# Patient Record
Sex: MALE | Race: WHITE | NOT HISPANIC OR LATINO | Employment: FULL TIME | URBAN - METROPOLITAN AREA
[De-identification: names, ages, dates, MRNs, and addresses within clinical notes are randomized per-mention and may not be internally consistent; named-entity substitution may affect disease eponyms.]

---

## 2022-11-16 ENCOUNTER — OFFICE VISIT (OUTPATIENT)
Dept: FAMILY MEDICINE CLINIC | Facility: CLINIC | Age: 62
End: 2022-11-16

## 2022-11-16 VITALS
BODY MASS INDEX: 25.11 KG/M2 | WEIGHT: 175.4 LBS | SYSTOLIC BLOOD PRESSURE: 136 MMHG | HEART RATE: 72 BPM | OXYGEN SATURATION: 97 % | HEIGHT: 70 IN | DIASTOLIC BLOOD PRESSURE: 60 MMHG | TEMPERATURE: 96.1 F

## 2022-11-16 DIAGNOSIS — Z13.29 SCREENING FOR THYROID DISORDER: ICD-10-CM

## 2022-11-16 DIAGNOSIS — Z13.29 SCREENING FOR ENDOCRINE DISORDER: ICD-10-CM

## 2022-11-16 DIAGNOSIS — Z12.5 ENCOUNTER FOR PROSTATE CANCER SCREENING: ICD-10-CM

## 2022-11-16 DIAGNOSIS — Z13.0 SCREENING FOR DEFICIENCY ANEMIA: ICD-10-CM

## 2022-11-16 DIAGNOSIS — I10 PRIMARY HYPERTENSION: ICD-10-CM

## 2022-11-16 DIAGNOSIS — Z13.220 SCREENING FOR LIPID DISORDERS: ICD-10-CM

## 2022-11-16 DIAGNOSIS — Z12.11 ENCOUNTER FOR SCREENING COLONOSCOPY: ICD-10-CM

## 2022-11-16 DIAGNOSIS — Z00.00 WELL ADULT EXAM: Primary | ICD-10-CM

## 2022-11-16 DIAGNOSIS — F51.04 CHRONIC INSOMNIA: ICD-10-CM

## 2022-11-16 PROBLEM — L57.0 ACTINIC KERATOSIS: Status: ACTIVE | Noted: 2022-03-10

## 2022-11-16 PROBLEM — Z85.828 PERSONAL HISTORY OF SKIN CANCER: Status: ACTIVE | Noted: 2022-03-10

## 2022-11-16 PROBLEM — C44.91: Status: ACTIVE | Noted: 2021-12-15

## 2022-11-16 PROBLEM — C44.92: Status: ACTIVE | Noted: 2022-05-26

## 2022-11-16 PROBLEM — C44.329 SQUAMOUS CELL CARCINOMA OF CHEEK: Status: ACTIVE | Noted: 2021-12-15

## 2022-11-16 PROBLEM — R73.01 IFG (IMPAIRED FASTING GLUCOSE): Status: ACTIVE | Noted: 2021-11-12

## 2022-11-16 PROBLEM — L82.1 SEBORRHEIC KERATOSIS: Status: ACTIVE | Noted: 2022-03-10

## 2022-11-16 RX ORDER — HYDROCHLOROTHIAZIDE 25 MG/1
TABLET ORAL
COMMUNITY
End: 2022-11-16 | Stop reason: SDUPTHER

## 2022-11-16 RX ORDER — FLUOROURACIL 50 MG/G
CREAM TOPICAL DAILY
COMMUNITY
Start: 2022-03-10 | End: 2022-11-28 | Stop reason: ALTCHOICE

## 2022-11-16 RX ORDER — AMLODIPINE BESYLATE 10 MG/1
10 TABLET ORAL DAILY
COMMUNITY
Start: 2022-09-27

## 2022-11-16 RX ORDER — TRAZODONE HYDROCHLORIDE 100 MG/1
100 TABLET ORAL
Qty: 90 TABLET | Refills: 3
Start: 2022-11-16

## 2022-11-16 RX ORDER — HYDROCHLOROTHIAZIDE 25 MG/1
25 TABLET ORAL DAILY
Qty: 90 TABLET | Refills: 3 | Status: SHIPPED | OUTPATIENT
Start: 2022-11-16

## 2022-11-16 RX ORDER — AMLODIPINE BESYLATE 10 MG/1
10 TABLET ORAL DAILY
Qty: 90 TABLET | Refills: 3 | Status: CANCELLED
Start: 2022-11-16

## 2022-11-28 PROBLEM — Z00.00 WELL ADULT EXAM: Status: ACTIVE | Noted: 2022-11-28

## 2022-11-28 PROBLEM — F51.04 CHRONIC INSOMNIA: Status: ACTIVE | Noted: 2022-11-28

## 2022-11-28 NOTE — PROGRESS NOTES
Subjective     Ty Mayes is a 65 y o   male and is here for routine health maintenance  The patient reports no problems  History of Present Illness     Pt is here to establish care  Was seen in [address] [place] [address],[address] [facility]      Well Adult Physical   Patient here for a comprehensive physical exam       Diet and Physical Activity  Diet: well balanced diet  Weight concerns: Patient is overweight (BMI 25 0-29  9)  Exercise: infrequently      Depression Screen  PHQ-2/9 Depression Screening    Little interest or pleasure in doing things: 0 - not at all  Feeling down, depressed, or hopeless: 0 - not at all  PHQ-2 Score: 0  PHQ-2 Interpretation: Negative depression screen          General Health  Hearing: Normal:  bilateral  Vision: no vision problems  Dental: regular dental visits      Cancer Screening  Colononoscopy schedule  PSA blood work due    Smoker NO   Annual screening with low-dose helical computed tomography (CT) for patients age 54 to 76 years with history of smoking at least 30 pack-years and, if a former smoker, had quit within the previous 15 years      The following portions of the patient's history were reviewed and updated as appropriate: allergies, current medications, past family history, past medical history, past social history, past surgical history and problem list     Review of Systems     Review of Systems   Constitutional: Negative  Negative for fatigue and fever  HENT: Negative  Eyes: Negative  Respiratory: Negative  Negative for cough  Cardiovascular: Negative  Gastrointestinal: Negative  Endocrine: Negative  Genitourinary: Negative  Musculoskeletal: Negative  Skin: Negative  Allergic/Immunologic: Negative  Neurological: Negative  Psychiatric/Behavioral: Negative  Past Medical History     History reviewed  No pertinent past medical history  Past Surgical History     History reviewed  No pertinent surgical history      Social History     Social History     Socioeconomic History   • Marital status: Single     Spouse name: None   • Number of children: None   • Years of education: None   • Highest education level: None   Occupational History   • None   Tobacco Use   • Smoking status: Former     Types: Cigarettes     Quit date:      Years since quittin 9   • Smokeless tobacco: Never   Substance and Sexual Activity   • Alcohol use: Yes     Alcohol/week: 15 0 standard drinks     Types: 15 Cans of beer per week   • Drug use: Never   • Sexual activity: None   Other Topics Concern   • None   Social History Narrative   • None     Social Determinants of Health     Financial Resource Strain: Not on file   Food Insecurity: Not on file   Transportation Needs: Not on file   Physical Activity: Not on file   Stress: Not on file   Social Connections: Not on file   Intimate Partner Violence: Not on file   Housing Stability: Not on file       Family History     Family History   Problem Relation Age of Onset   • Colon cancer Mother    • Uterine cancer Mother    • Skin cancer Mother        Current Medications       Current Outpatient Medications:   •  amLODIPine (NORVASC) 10 mg tablet, Take 10 mg by mouth daily, Disp: , Rfl:   •  hydrochlorothiazide (HYDRODIURIL) 25 mg tablet, Take 1 tablet (25 mg total) by mouth daily, Disp: 90 tablet, Rfl: 3  •  traZODone (DESYREL) 100 mg tablet, Take 1 tablet (100 mg total) by mouth daily at bedtime, Disp: 90 tablet, Rfl: 3     Allergies     No Known Allergies    Objective     /60   Pulse 72   Temp (!) 96 1 °F (35 6 °C)   Ht 5' 9 5" (1 765 m)   Wt 79 6 kg (175 lb 6 4 oz)   SpO2 97%   BMI 25 53 kg/m²      Physical Exam  Vitals and nursing note reviewed  Constitutional:       Appearance: He is well-developed and well-nourished  HENT:      Head: Normocephalic        Right Ear: External ear normal       Left Ear: External ear normal       Nose: Nose normal       Mouth/Throat:      Mouth: Oropharynx is clear and moist    Eyes: Conjunctiva/sclera: Conjunctivae normal       Pupils: Pupils are equal, round, and reactive to light  Cardiovascular:      Rate and Rhythm: Normal rate and regular rhythm  Pulses: Intact distal pulses  Heart sounds: Normal heart sounds  Pulmonary:      Effort: Pulmonary effort is normal       Breath sounds: Normal breath sounds  Abdominal:      General: Bowel sounds are normal       Palpations: Abdomen is soft  Musculoskeletal:      Cervical back: Normal range of motion and neck supple  Skin:     General: Skin is warm and dry  Neurological:      Mental Status: He is alert and oriented to person, place, and time  Psychiatric:         Mood and Affect: Mood and affect normal          Behavior: Behavior normal          Thought Content: Thought content normal          Judgment: Judgment normal            No results found      Health Maintenance     Health Maintenance   Topic Date Due   • Hepatitis C Screening  Never done   • Hepatitis B Vaccine (1 of 3 - 3-dose series) Never done   • HIV Screening  Never done   • BMI: Followup Plan  Never done   • Annual Physical  Never done   • Colorectal Cancer Screening  Never done   • COVID-19 Vaccine (3 - Booster for Moderna series) 11/03/2021   • Depression Screening  11/16/2023   • BMI: Adult  11/16/2023   • DTaP,Tdap,and Td Vaccines (2 - Td or Tdap) 09/22/2027   • Influenza Vaccine  Completed   • Pneumococcal Vaccine: Pediatrics (0 to 5 Years) and At-Risk Patients (6 to 59 Years)  Aged Out   • HIB Vaccine  Aged Out   • IPV Vaccine  Aged Out   • Hepatitis A Vaccine  Aged Out   • Meningococcal ACWY Vaccine  Aged Out   • HPV Vaccine  Aged [employer] History   Administered Date(s) Administered   • COVID-19 MODERNA VACC 0 5 ML IM 05/06/2021, 06/03/2021   • Influenza Quadrivalent Preservative Free 3 years and older IM 10/12/2020, 10/18/2021, 10/25/2022   • Tdap 09/22/2017   • Zoster Vaccine Recombinant 10/18/2021, 10/25/2022       Assessment/Plan 1  Healthy male exam   2  Patient Counseling:   · Nutrition: Stressed importance of a well balanced diet, moderation of sodium/saturated fat, caloric balance and sufficient intake of fiber  · Exercise: Stressed the importance of regular exercise with a goal of 150 minutes per week  · Dental Health: Discussed daily flossing and brushing and regular dental visits     · Immunizations reviewed  · Discussed benefits of screening   · Discussed the patient's BMI with him  The BMI is above average; BMI management plan is completed  3  Cancer Screening   4  Labs   5  Trial trazodone, fasting blood work, schedule colonoscopy  6  Follow up in one year      Armen Rodriguez DO

## 2023-01-27 PROBLEM — Z00.00 WELL ADULT EXAM: Status: RESOLVED | Noted: 2022-11-28 | Resolved: 2023-01-27

## 2023-02-02 ENCOUNTER — TELEPHONE (OUTPATIENT)
Dept: OTHER | Facility: OTHER | Age: 63
End: 2023-02-02

## 2023-02-02 ENCOUNTER — TELEPHONE (OUTPATIENT)
Dept: GASTROENTEROLOGY | Facility: AMBULARY SURGERY CENTER | Age: 63
End: 2023-02-02

## 2023-02-02 NOTE — TELEPHONE ENCOUNTER
K[facility] R[facility] 28 Assessment    Name: Ebony Vega  YOB: 1960  Last Height: 5' 9 5" (1 765 m)  Last weight: 79 6 kg (175 lb 6 4 oz)  BMI: 25 53 kg/m²  Procedure: colonoscopy  Diagnosis: screening   Date of procedure: 3-8-2023  Prep:   Responsible : yes sister Jose Roberto Posada  Phone#: [phone]  Name completing form: Tanna Hendrickss  Date form completed: 02/02/23      If the patient answers yes to any of these questions, schedule in a hospital  Are you pregnant: No  Do you rely on a wheelchair for mobility: No  Have you been diagnosed with End Stage Renal Disease (ESRD): No  Do you need oxygen during the day: No  Have you had a heart attack or stroke within the past three months: No  Have you had a seizure within the past three months: No  Have you ever been informed by anesthesia that you have a difficult airway: No  Additional Questions  Have you had any cardiac testing or are under the care of a Cardiologist (see cardiac list): No  Cardiac list:   Do you have an implanted cardiac defibrillator: No (Comment:  This patient should be scheduled in the hospital)    Have any bleeding problems, such as anemia or hemophilia (If patient has H&H result below 8, schedule in hospital   H&H must be within 30 days of procedure): No    Had an organ transplant within the past 3 months: No    Do you have any present infections: No  Do you get short of breath when walking a few blocks: No  Have you been diagnosed with diabetes: No  Comments (provide cardiac provider information if applicable):

## 2023-02-02 NOTE — TELEPHONE ENCOUNTER
Pt needs a NPI # and a Diagnostic code sent to his gastro office in [place] to move forward with his surgery

## 2023-02-02 NOTE — TELEPHONE ENCOUNTER
Scheduled date of colonoscopy (as of today):03-   Physician performing colonoscopy:CARTER   Location of colonoscopy:[facility]  Bowel prep reviewed with patient:? Instructions reviewed with patient by:?   Clearances: N/A

## 2023-02-06 ENCOUNTER — TELEPHONE (OUTPATIENT)
Dept: FAMILY MEDICINE CLINIC | Facility: CLINIC | Age: 63
End: 2023-02-06

## 2023-02-06 NOTE — TELEPHONE ENCOUNTER
Confirmation Dejuan Dickson [id number]  Effective: 02/06/2023     Expires: 05/07/2023  Active  Referred From  [address]  Group KHJ: [id number]  Provider HZ: [id number]  Tax DM: [id number]  [address] [facility]  [place] [id number]  Referred To  [facility]  Specialty: Not Available  Tier 1  Group KYP: [id number]  Provider EH: [id number]  Tax NI: [id number]  Individual Provider OFK: [id number]  Provider Judd Guerra MD  This referral is valid at any location for the above group  Patient Pablo Brizuela  [id number]  Male  1960  [address] [place] [id number]  Clinical Information  Place of Service  Office  Service Type  Medical Care  Diagnoses  5 G78 31 - other general symptoms and signs  Procedures  2 43456 - unlisted evaluation and management service  Disclaimer  [facility] [employer] [facility] and [facility] Affiliates ([facility]) will pay for only those services covered under the [facility] Contract which are specifically noted and requested by the PCP or OBGYN on the referral  If any additional services, testing, or follow-up care are required, the PCP or OBGYN must be contacted prior to the delivery of such additional services for written approval on a separate referral  Non-referred services will not be covered by [facility]  Benefits are underwritten or administered by [employer] a subsidiary of [employer], which are independent licensees of the [employer] and [employer]

## 2023-02-14 ENCOUNTER — CONSULT (OUTPATIENT)
Dept: DERMATOLOGY | Facility: CLINIC | Age: 63
End: 2023-02-14

## 2023-02-14 VITALS — TEMPERATURE: 97.8 F | HEIGHT: 69 IN | BODY MASS INDEX: 27.55 KG/M2 | WEIGHT: 186 LBS

## 2023-02-14 DIAGNOSIS — D22.70 MULTIPLE BENIGN MELANOCYTIC NEVI OF UPPER AND LOWER EXTREMITIES AND TRUNK: ICD-10-CM

## 2023-02-14 DIAGNOSIS — L91.0 KELOID SCAR: ICD-10-CM

## 2023-02-14 DIAGNOSIS — Z85.828 HISTORY OF BASAL CELL CARCINOMA: ICD-10-CM

## 2023-02-14 DIAGNOSIS — D22.60 MULTIPLE BENIGN MELANOCYTIC NEVI OF UPPER AND LOWER EXTREMITIES AND TRUNK: ICD-10-CM

## 2023-02-14 DIAGNOSIS — D22.5 MULTIPLE BENIGN MELANOCYTIC NEVI OF UPPER AND LOWER EXTREMITIES AND TRUNK: ICD-10-CM

## 2023-02-14 DIAGNOSIS — L57.0 ACTINIC KERATOSIS: ICD-10-CM

## 2023-02-14 DIAGNOSIS — Z85.89 HISTORY OF SQUAMOUS CELL CARCINOMA: Primary | ICD-10-CM

## 2023-02-14 RX ORDER — FLUOROURACIL 50 MG/G
CREAM TOPICAL 2 TIMES DAILY
Qty: 40 G | Refills: 0 | Status: SHIPPED | OUTPATIENT
Start: 2023-02-14

## 2023-02-14 RX ORDER — CALCIPOTRIENE 50 UG/G
CREAM TOPICAL 2 TIMES DAILY
Qty: 60 G | Refills: 0 | Status: SHIPPED | OUTPATIENT
Start: 2023-02-14

## 2023-02-14 NOTE — PROGRESS NOTES
[facility] Note     Patient Name: Adalgisa Jackson  Encounter Date: 2/14/2023    Have you been cared for by a [facility]ologist in the last 3 years and, if so, which description applies to you? NO  I am considered a "new" patient and must complete all patient intake questions  I am MALE/not capable of bearing children  REVIEW OF SYSTEMS:  Have you recently had or currently have any of the following? · Recent fever or chills? No  · Any non-healing wound? No   PAST MEDICAL HISTORY:  Have you personally ever had or currently have any of the following? If "YES," then please provide more detail  · Skin cancer (such as Melanoma, Basal Cell Carcinoma, Squamous Cell Carcinoma? YES, BCC and SCC  · Tuberculosis, HIV/AIDS, Hepatitis B or C: No  · Systemic Immunosuppression such as Diabetes, Biologic or Immunotherapy, Chemotherapy, Organ Transplantation, Bone Marrow Transplantation No  · Radiation Treatment No   FAMILY HISTORY:  Any "first degree relatives" (parent, brother, sister, or child) with the following? • Skin Cancer, Pancreatic or Other Cancer? YES, Mother had hx of BCC and SCC   PATIENT EXPERIENCE:    • Do you want the Dermatologist to perform a COMPLETE skin exam today including a clinical examination under the "bra and underwear" areas? Yes  • If necessary, do we have your permission to call and leave a detailed message on your Preferred Phone number that includes your specific medical information?   NO      No Known Allergies   Current Outpatient Medications:   •  amLODIPine (NORVASC) 10 mg tablet, Take 10 mg by mouth daily, Disp: , Rfl:   •  hydrochlorothiazide (HYDRODIURIL) 25 mg tablet, Take 1 tablet (25 mg total) by mouth daily, Disp: 90 tablet, Rfl: 3  •  traZODone (DESYREL) 100 mg tablet, Take 1 tablet (100 mg total) by mouth daily at bedtime, Disp: 90 tablet, Rfl: 3          • Whom besides the patient is providing clinical information about today's encounter?   o NO ADDITIONAL HISTORIAN (patient alone provided history)    Physical Exam and Assessment/Plan by Diagnosis:      HISTORY OF BASAL CELL CARCINOMA    Physical Exam:  • Anatomic Location Affected:  Multiple sites: Left clavicular, Right Upper back and left upper back  • Morphological Description of scar:  Well healed surgical scar   • Suspected Recurrence: No  • Pertinent Positives:  • Pertinent Negatives: Additional History of Present Condition:  History of basal cell carcinoma with no sign of recurrence    Assessment and Plan:  Based on a thorough discussion of this condition and the management approach to it (including a comprehensive discussion of the known risks, side effects and potential benefits of treatment), the patient (family) agrees to implement the following specific plan:  • Monitor for recurrence or new lesions  • When outside we recommend using a wide brim hat, sunglasses, long sleeve and pants, sunscreen with SPF 52+ with reapplication every 2 hours, or SPF specific clothing   • Recommended yearly skin checks    How can basal cell carcinoma be prevented? The most important way to prevent BCC is to avoid sunburn  This is especially important in childhood and early life  Fair skinned individuals and those with a personal or family history of BCC should protect their skin from sun exposure daily, year-round and lifelong  • Stay indoors or under the shade in the middle of the day   • Wear covering clothing   • Apply high protection factor SPF50+ broad-spectrum sunscreens generously to exposed skin if outdoors   • Avoid indoor tanning (sun beds, solaria)  • Oral nicotinamide (vitamin B3) in a dose of 500 mg twice daily may reduce the number and severity of BCCs  What is the outlook for basal cell carcinoma? Most BCCs are cured by treatment  Cure is most likely if treatment is undertaken when the lesion is small    About 50% of people with BCC develop a second one within 3 years of the first  They are also at increased risk of other skin cancers, especially melanoma  Regular self-skin examinations and long-term annual skin checks by an experienced health professional are recommended  HISTORY OF SQUAMOUS CELL CARCINOMA     Physical Exam:  • Anatomic Location Affected:  Multiple sites ( Left [address] by plastics on 2/1/2021, Left cheek  • Morphological Description of Scar:  Well healed surgical scars  • Suspected Recurrence: no  • Regional adenopathy: no    Additional History of Present Condition:  History of SCC with no sign of recurrence  Assessment and Plan:  Based on a thorough discussion of this condition and the management approach to it (including a comprehensive discussion of the known risks, side effects and potential benefits of treatment), the patient (family) agrees to implement the following specific plan:  • When outside we recommend using a wide brim hat, sunglasses, long sleeve and pants, sunscreen with SPF 93+ with reapplication every 2 hours, or SPF specific clothing   • Monitor for recurrence or new lesions  • Recommended yearly skin checks  How can SCC be prevented? The most important way to prevent SCC is to avoid sunburn  This is especially important in childhood and early life  Fair skinned individuals and those with a personal or family history of BCC should protect their skin from sun exposure daily, year-round and lifelong  • Stay indoors or under the shade in the middle of the day   • Wear covering clothing   • Apply high protection factor SPF50+ broad-spectrum sunscreens generously to exposed skin if outdoors   • Avoid indoor tanning (sun beds, solaria)      What is the outlook for SCC? Most SCC are cured by treatment  Cure is most likely if treatment is undertaken when the lesion is small  A small percent of SCC's can spread to lymph  nodes and long term monitoring is indicated  They are also at increased risk of other skin cancers, especially melanoma  Regular self-skin examinations and long-term annual skin checks by an experienced health professional are recommended  ACTINIC KERATOSIS    Physical Exam:  • Anatomic Location Affected:  Scalp   • Morphological Description:   No induration     Additional History of Present Condition:  Patient states that he had scalp lesions treated previously with Fluorouracil 5% cream for couple days ( unsure for how  Long), but did not have any reaction to the treatment  He would like to try to treat lesions again  Assessment and Plan:  Based on a thorough discussion of this condition and the management approach to it (including a comprehensive discussion of the known risks, side effects and potential benefits of treatment), the patient (family) agrees to implement the following specific plan:    • Start Calcipotriene 0 05% cream and Fluorouracil 5% cream to scalp as explained in office  Use both topical creams together for 5 days  Discussed side effects like redness, irritation, burning sensation  Explained these symptoms as normal   May take a picture and message Dr Ivan Lujan through [email or web address] tanisha to see progress  • Calcipotriene cream and Fluorouracil cream ordered  Actinic keratoses are very common on sites repeatedly exposed to the sun, especially the backs of the hands and the face, most often affecting the ears, nose, cheeks, upper lip, vermilion of the lower lip, temples, forehead and balding scalp  In severely chronically sun-damaged individuals, they may also be found on the upper trunk, upper and lower limbs, and dorsum of feet  We discussed the theoretical premalignant (“pre-cancerous”) nature and etiology of these growths  We discussed the prevailing notion that actinic keratoses are a reflection of abnormal skin cell development due to DNA damage by short wavelength UVB    They are more likely to appear if the immune function is poor, due to aging, recent sun exposure, predisposing disease or certain drugs  We discussed that the main concern is that actinic keratoses may predispose to squamous cell carcinoma  It is rare for a solitary actinic keratosis to evolve to squamous cell carcinoma (SCC), but the risk of SCC occurring at some stage in a patient with more than 10 actinic keratoses is thought to be about 10 to 15%  A tender, thickened, ulcerated or enlarging actinic keratosis is suspicious of SCC  Actinic keratoses may be prevented by strict sun protection  If already present, keratoses may improve with a very high sun protection factor (50+) broad-spectrum sunscreen applied at least daily to affected areas, year-round  We recommend that UPF-rated clothing and hats and sunglasses be worn whenever possible and that a sunscreen-moisturizer combination product such as Neutrogena Daily Defense be applied at least three times a day  We performed a thorough discussion of treatment options and specific risk/benefits/alternatives including but not limited to medical “field” treatment with medications such as the following:    • Topical “field area” medications such as 5-fluorouracil or Aldara (specifically, the trouble with long-term compliance, blistering and local skin reaction versus the convenience of at-home therapy and that field therapy “gets what is not yet seen”)  • Cryotherapy (specifically, local pain, scarring, dyspigmentation, blistering, possible superinfection, and treats “only what we see” versus directed treatment today)  • Photodynamic therapy (specifically, local pain, scarring, dyspigmentation, blistering, possible superinfection, need to schedule for a later date, and time spent in the office versus field therapy that “gets what is not yet seen”)  KELOID SCAR    Physical Exam:  • Anatomic Location Affected:  Back  • Morphological Description:  hypertrophic  • Pertinent Positives:  • Pertinent Negatives:     Additional History of Present Condition:  Previous excisions scars  Denies pain, or issues  Assessment and Plan:  Based on a thorough discussion of this condition and the management approach to it (including a comprehensive discussion of the known risks, side effects and potential benefits of treatment), the patient (family) agrees to implement the following specific plan:  • Reassured benign  No treatment necessary     A keloid scar is a firm, smooth, hard growth due to spontaneous scar formation  It can arise soon after an injury, or develop months later  Keloids may be uncomfortable or itchy and extend well beyond the original wound  They may form on any part of the body, although the upper chest and shoulders are especially prone to them  The precise reason that wound healing sometimes leads to keloid formation is under investigation but is not yet clear  While most people never form keloids, others develop them after minor injuries, burns, insect bites and acne spots  Dark skinned people form keloids more easily than Caucasians  A keloid is harmless to general health and does not change into skin cancer  The following measures are helpful in at least some patients  • Emollients (creams and oils)  • Polyurethane or silicone scar reduction patches  • Silicone gel  • Oral or topical tranilast (an inhibitor of collagen synthesis)  • Pressure dressings  • Surgical excision (but in keloids, excision may result in a new keloid even larger than the original one)  • Intralesional corticosteroid injection, repeated every few weeks  • Intralesional 5-fluorouracil  • Cryotherapy  • Superficial X-ray treatment soon after surgery  • Pulsed dye laser   • Skin needling  • Subcision    Scar dressings should be worn for 12-24 hours per day, for at least 8 to 12 weeks, and perhaps for much longer            MELANOCYTIC NEVI ("Moles")    Physical Exam:  • Anatomic Location Affected:   Mostly on sun-exposed areas of the trunk and extremities  • Morphological Description: Scattered, 1-4mm round to ovoid, symmetric and evenly bordered, regularly pigmented macules/papules without outliers other than if noted elsewhere in today's note  • Pertinent Positives:  • Pertinent Negatives: Additional History of Present Condition:      Assessment and Plan:  Based on a thorough discussion of this condition and the management approach to it (including a comprehensive discussion of the known risks, side effects and potential benefits of treatment), the patient (family) agrees to implement the following specific plan:  • The patient was encouraged to use an SPF30+ broad spectrum sunscreen daily and re-apply every 2-3 outdoors while outside  The importance of sun protection, self-skin exams, and sun avoidance was emphasized  An annual full body skin exam is recommended, and the patient was encouraged to return to the office sooner for any new or changing lesions of concerns  • Benign, reassured  • Annual skin check     Melanocytic Nevi  Melanocytic nevi ("moles") are tan or brown, raised or flat areas of the skin which have an increased number of melanocytes  Melanocytes are the cells in our body which make pigment and account for skin color  Some moles are present at birth (I e , "congenital nevi"), while others come up later in life (i e , "acquired nevi")  The sun can stimulate the body to make more moles  Sunburns are not the only thing that triggers more moles  Chronic sun exposure can do it too  Clinically distinguishing a healthy mole from melanoma may be difficult, even for experienced dermatologists  The "ABCDE's" of moles have been suggested as a means of helping to alert a person to a suspicious mole and the possible increased risk of melanoma  The suggestions for raising alert are as follows:    Asymmetry: Healthy moles tend to be symmetric, while melanomas are often asymmetric    Asymmetry means if you draw a line through the mole, the two halves do not match in color, size, shape, or surface texture  Asymmetry can be a result of rapid enlargement of a mole, the development of a raised area on a previously flat lesion, scaling, ulceration, bleeding or scabbing within the mole  Any mole that starts to demonstrate "asymmetry" should be examined promptly by a board certified dermatologist      Border: Healthy moles tend to have discrete, even borders  The border of a melanoma often blends into the normal skin and does not sharply delineate the mole from normal skin  Any mole that starts to demonstrate "uneven borders" should be examined promptly by a board certified dermatologist      Color: Healthy moles tend to be one color throughout  Melanomas tend to be made up of different colors ranging from dark black, blue, white, or red  Any mole that demonstrates a color change should be examined promptly by a board certified dermatologist      Diameter: Healthy moles tend to be smaller than 0 6 cm in size; an exception are "congenital nevi" that can be larger  Melanomas tend to grow and can often be greater than 0 6 cm (1/4 of an inch, or the size of a pencil eraser)  This is only a guideline, and many normal moles may be larger than 0 6 cm without being unhealthy  Any mole that starts to change in size (small to bigger or bigger to smaller) should be examined promptly by a board certified dermatologist      Evolving: Healthy moles tend to "stay the same "  Melanomas may often show signs of change or evolution such as a change in size, shape, color, or elevation    Any mole that starts to itch, bleed, crust, burn, hurt, or ulcerate or demonstrate a change or evolution should be examined promptly by a board certified dermatologist             Jr Rojo,:  Isrrael Christian am acting as a scribe while in the presence of the attending physician :       I,:  Manasa Riojas MD personally performed the services described in this documentation    as scribed in my presence :

## 2023-02-20 ENCOUNTER — TELEPHONE (OUTPATIENT)
Dept: DERMATOLOGY | Facility: CLINIC | Age: 63
End: 2023-02-20

## 2023-02-20 NOTE — TELEPHONE ENCOUNTER
Patient left a message asking for a return call to discuss a procedure he is supposed to do  Patient was seen by Dr Brenda Tobar on 2/14/23 and in the note the only treatment I see if the use of calcipotriene and fluorouracil to scalp for 5 days  Attempt to reach patient  Left voicemail asking patient to return call

## 2023-02-22 ENCOUNTER — TELEPHONE (OUTPATIENT)
Dept: GASTROENTEROLOGY | Facility: CLINIC | Age: 63
End: 2023-02-22

## 2023-02-22 DIAGNOSIS — Z12.11 SCREENING FOR COLON CANCER: Primary | ICD-10-CM

## 2023-02-22 NOTE — TELEPHONE ENCOUNTER
Procedure confirmed  Colonoscopy     Via: Voice mail    Instructions given: Email     Prep Given: Golytely    Call the office if there are any questions

## 2023-03-06 ENCOUNTER — TELEPHONE (OUTPATIENT)
Dept: GASTROENTEROLOGY | Facility: CLINIC | Age: 63
End: 2023-03-06

## 2023-03-06 DIAGNOSIS — Z12.11 SCREENING FOR COLON CANCER: Primary | ICD-10-CM

## 2023-03-06 RX ORDER — SOD SULF/POT CHLORIDE/MAG SULF 1.479 G
TABLET ORAL
Qty: 24 TABLET | Refills: 0 | Status: SHIPPED | OUTPATIENT
Start: 2023-03-06

## 2023-03-06 NOTE — TELEPHONE ENCOUNTER
Patient called requesting Sutab, advised pt may not be covered by ins  He was ok with that   Will send instructions

## 2023-03-08 ENCOUNTER — HOSPITAL ENCOUNTER (OUTPATIENT)
Dept: GASTROENTEROLOGY | Facility: AMBULATORY SURGERY CENTER | Age: 63
Discharge: HOME | End: 2023-03-08

## 2023-03-08 ENCOUNTER — ANESTHESIA (OUTPATIENT)
Dept: GASTROENTEROLOGY | Facility: AMBULATORY SURGERY CENTER | Age: 63
End: 2023-03-08

## 2023-03-08 ENCOUNTER — ANESTHESIA EVENT (OUTPATIENT)
Dept: GASTROENTEROLOGY | Facility: AMBULATORY SURGERY CENTER | Age: 63
End: 2023-03-08

## 2023-03-08 VITALS
SYSTOLIC BLOOD PRESSURE: 169 MMHG | HEART RATE: 57 BPM | TEMPERATURE: 98.4 F | WEIGHT: 185 LBS | RESPIRATION RATE: 22 BRPM | OXYGEN SATURATION: 99 % | BODY MASS INDEX: 27.4 KG/M2 | DIASTOLIC BLOOD PRESSURE: 72 MMHG | HEIGHT: 69 IN

## 2023-03-08 DIAGNOSIS — Z12.11 SCREENING FOR COLON CANCER: ICD-10-CM

## 2023-03-08 RX ORDER — PROPOFOL 10 MG/ML
INJECTION, EMULSION INTRAVENOUS AS NEEDED
Status: DISCONTINUED | OUTPATIENT
Start: 2023-03-08 | End: 2023-03-08

## 2023-03-08 RX ORDER — SODIUM CHLORIDE, SODIUM LACTATE, POTASSIUM CHLORIDE, CALCIUM CHLORIDE 600; 310; 30; 20 MG/100ML; MG/100ML; MG/100ML; MG/100ML
50 INJECTION, SOLUTION INTRAVENOUS CONTINUOUS
Status: DISCONTINUED | OUTPATIENT
Start: 2023-03-08 | End: 2023-03-12 | Stop reason: HOSPADM

## 2023-03-08 RX ORDER — LIDOCAINE HYDROCHLORIDE 10 MG/ML
INJECTION, SOLUTION EPIDURAL; INFILTRATION; INTRACAUDAL; PERINEURAL AS NEEDED
Status: DISCONTINUED | OUTPATIENT
Start: 2023-03-08 | End: 2023-03-08

## 2023-03-08 RX ADMIN — PROPOFOL 20 MG: 10 INJECTION, EMULSION INTRAVENOUS at 15:03

## 2023-03-08 RX ADMIN — PROPOFOL 20 MG: 10 INJECTION, EMULSION INTRAVENOUS at 14:51

## 2023-03-08 RX ADMIN — SODIUM CHLORIDE, SODIUM LACTATE, POTASSIUM CHLORIDE, CALCIUM CHLORIDE 50 ML/HR: 600; 310; 30; 20 INJECTION, SOLUTION INTRAVENOUS at 14:28

## 2023-03-08 RX ADMIN — PROPOFOL 20 MG: 10 INJECTION, EMULSION INTRAVENOUS at 15:07

## 2023-03-08 RX ADMIN — LIDOCAINE HYDROCHLORIDE 50 MG: 10 INJECTION, SOLUTION EPIDURAL; INFILTRATION; INTRACAUDAL; PERINEURAL at 14:51

## 2023-03-08 RX ADMIN — PROPOFOL 20 MG: 10 INJECTION, EMULSION INTRAVENOUS at 15:11

## 2023-03-08 RX ADMIN — PROPOFOL 20 MG: 10 INJECTION, EMULSION INTRAVENOUS at 14:59

## 2023-03-08 RX ADMIN — PROPOFOL 150 MG: 10 INJECTION, EMULSION INTRAVENOUS at 14:47

## 2023-03-08 RX ADMIN — PROPOFOL 20 MG: 10 INJECTION, EMULSION INTRAVENOUS at 14:56

## 2023-03-08 NOTE — ANESTHESIA PREPROCEDURE EVALUATION
Procedure:  COLONOSCOPY    Relevant Problems   CARDIO   (+) Hypertension      NEURO/PSYCH   (+) Personal history of skin cancer        Physical Exam    Airway    Mallampati score: I  TM Distance: >3 FB  Neck ROM: full     Dental       Cardiovascular  Cardiovascular exam normal    Pulmonary  Pulmonary exam normal     Other Findings        Anesthesia Plan  ASA Score- 2     Anesthesia Type- IV sedation with anesthesia with ASA Monitors  Additional Monitors:   Airway Plan:           Plan Factors-Exercise tolerance (METS): >4 METS  Chart reviewed  EKG reviewed  Imaging results reviewed  Existing labs reviewed  Patient summary reviewed  Induction- intravenous  Postoperative Plan- Plan for postoperative opioid use  Planned trial extubation    Informed Consent- Anesthetic plan and risks discussed with patient  I personally reviewed this patient with the CRNA  Discussed and agreed on the Anesthesia Plan with the CRNA  Derek Lobo

## 2023-03-08 NOTE — ANESTHESIA POSTPROCEDURE EVALUATION
Post-Op Assessment Note    CV Status:  Stable  Pain Score: 0    Pain management: adequate     Mental Status:  Alert and awake   Hydration Status:  Euvolemic   PONV Controlled:  None   Airway Patency:  Patent      Post Op Vitals Reviewed: Yes      Staff: Anesthesiologist, CRNA   Comments: report given to RN; VSS; RA        No notable events documented      BP  157/77   Temp      Pulse  52   Resp   16   SpO2   98

## 2023-03-08 NOTE — H&P
History and Physical - [facility]  Lorena Matthews 65 y o  male MRN: [id number]    HPI: Lorena Matthews is a 65y o  year old male who presents for screening colonoscopy average risk    REVIEW OF SYSTEMS: Per the HPI, and otherwise unremarkable      Historical Information   Past Medical History:   Diagnosis Date   • Hypertension    • Kidney stone    • Skin cancer    • Gibran-Parkinson-White (WPW) syndrome      Past Surgical History:   Procedure Laterality Date   • BASAL CELL CARCINOMA EXCISION     • CARDIAC ELECTROPHYSIOLOGY MAPPING AND ABLATION     • CYSTOSCOPY W/ LASER LITHOTRIPSY     • HERNIA REPAIR     • SQUAMOUS CELL CARCINOMA EXCISION     • WISDOM TOOTH EXTRACTION       Social History   Social History     Substance and Sexual Activity   Alcohol Use Yes   • Alcohol/week: 15 0 standard drinks   • Types: 15 Cans of beer per week     Social History     Substance and Sexual Activity   Drug Use Never     Social History     Tobacco Use   Smoking Status Former   • Types: Cigarettes   • Quit date:    • Years since quittin 1   Smokeless Tobacco Never     Family History   Problem Relation Age of Onset   • Colon cancer Mother    • Uterine cancer Mother    • Skin cancer Mother        Meds/Allergies       Current Outpatient Medications:   •  amLODIPine (NORVASC) 10 mg tablet  •  hydrochlorothiazide (HYDRODIURIL) 25 mg tablet  •  calcipotriene (DOVONEX) 0 005 % cream  •  fluorouracil (EFUDEX) 5 % cream  •  polyethylene glycol (GOLYTELY) 4000 mL solution  •  Sodium Sulfate-Mag Sulfate-KCl (Sutab) 1828-146-155 MG TABS  •  traZODone (DESYREL) 100 mg tablet    Current Facility-Administered Medications:   •  lactated ringers infusion, 50 mL/hr, Intravenous, Continuous, 50 mL/hr at 23 1428    No Known Allergies    Objective     BP (!) 193/88 Comment: Dr Rachael Diamond at bedside is aware of elervated BP  Pulse 68   Temp 98 4 °F (36 9 °C) (Temporal)   Resp 19   Ht 5' 9" (1 753 m)   Wt 83 9 kg (185 lb)   SpO2 100% BMI 27 32 kg/m²     PHYSICAL EXAM    Gen: NAD AAOx3  Head: Normocephalic, Atraumatic  CV: S1S2 RRR no m/r/g  CHEST: Clear b/l no c/r/w  ABD: soft, +BS NT/ND  EXT: no edema    ASSESSMENT/PLAN:  This is a 65y o  year old male here for screening colonoscopy, and he is stable and optimized for his procedure

## 2023-03-16 NOTE — RESULT ENCOUNTER NOTE
I called the patient and reviewed pathology  He did have 3 adenomas  Accordingly he should have a 3-year recall as opposed to a 5-year recall we will make that change in the EMR    Should have a 2-day prep for next exam   He did take Cyr tabs which were not that effective on this last exam

## 2023-03-22 ENCOUNTER — TELEPHONE (OUTPATIENT)
Dept: FAMILY MEDICINE CLINIC | Facility: CLINIC | Age: 63
End: 2023-03-22

## 2023-03-22 NOTE — TELEPHONE ENCOUNTER
Patient called back requesting an answer as soon as possible because his job needs an answer today if he's coming in

## 2023-03-22 NOTE — TELEPHONE ENCOUNTER
Patient called in because he tested positive for Covid 3/21/2023  Sx began Saturday 3/18/2023 with a sore throat  Saturday-Tuesday he had symptoms and has been feeling better since yesterday and today  Patient would like to know if he's good to go back to work tomorrow since he feels a lot better? He doesn't need a note for work, nor a virtual he states

## 2023-05-09 DIAGNOSIS — I10 HYPERTENSION, UNSPECIFIED TYPE: ICD-10-CM

## 2023-05-09 RX ORDER — AMLODIPINE BESYLATE 10 MG/1
TABLET ORAL
Qty: 90 TABLET | Refills: 1 | Status: SHIPPED | OUTPATIENT
Start: 2023-05-09 | End: 2023-05-13 | Stop reason: SDUPTHER

## 2023-05-13 DIAGNOSIS — I10 PRIMARY HYPERTENSION: ICD-10-CM

## 2023-05-13 DIAGNOSIS — I10 HYPERTENSION, UNSPECIFIED TYPE: ICD-10-CM

## 2023-05-15 ENCOUNTER — TELEPHONE (OUTPATIENT)
Dept: OBGYN CLINIC | Facility: CLINIC | Age: 63
End: 2023-05-15

## 2023-05-15 ENCOUNTER — NURSE TRIAGE (OUTPATIENT)
Dept: OTHER | Facility: OTHER | Age: 63
End: 2023-05-15

## 2023-05-15 ENCOUNTER — OFFICE VISIT (OUTPATIENT)
Dept: URGENT CARE | Facility: CLINIC | Age: 63
End: 2023-05-15

## 2023-05-15 ENCOUNTER — APPOINTMENT (OUTPATIENT)
Dept: RADIOLOGY | Facility: CLINIC | Age: 63
End: 2023-05-15

## 2023-05-15 VITALS
RESPIRATION RATE: 18 BRPM | DIASTOLIC BLOOD PRESSURE: 82 MMHG | OXYGEN SATURATION: 98 % | BODY MASS INDEX: 26.81 KG/M2 | WEIGHT: 181 LBS | HEART RATE: 77 BPM | TEMPERATURE: 98.4 F | HEIGHT: 69 IN | SYSTOLIC BLOOD PRESSURE: 160 MMHG

## 2023-05-15 DIAGNOSIS — M25.512 ACUTE PAIN OF LEFT SHOULDER: ICD-10-CM

## 2023-05-15 DIAGNOSIS — S46.912A STRAIN OF LEFT SHOULDER, INITIAL ENCOUNTER: Primary | ICD-10-CM

## 2023-05-15 RX ORDER — AMLODIPINE BESYLATE 10 MG/1
10 TABLET ORAL DAILY
Qty: 90 TABLET | Refills: 0 | Status: SHIPPED | OUTPATIENT
Start: 2023-05-15

## 2023-05-15 RX ORDER — HYDROCHLOROTHIAZIDE 25 MG/1
25 TABLET ORAL DAILY
Qty: 90 TABLET | Refills: 0 | Status: SHIPPED | OUTPATIENT
Start: 2023-05-15

## 2023-05-15 NOTE — TELEPHONE ENCOUNTER
Caller: Sonido Melvin    Doctor: Marita Gregorio    Reason for call: NP 5/17/23 for shoulder strain ref by [facility]  Concerned they did not put him in a sling and wonders if he should put one on himself until he comes in      Call back#: [phone]    Thank you
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
## 2023-05-15 NOTE — TELEPHONE ENCOUNTER
"Regarding: fall, left shoulder pain  ----- Message from Vicky Avila sent at 5/15/2023  8:07 AM EDT -----  \"I fell yesterday and jammed up my left shoulder and am having pain  I plan on going to  after 10am but I want to make sure that is where I should be going for evaluation  \"    "

## 2023-05-15 NOTE — TELEPHONE ENCOUNTER
Patient advised to call [facility] or PCP for direction as we have not seen the patient in the office  Verbalized understanding

## 2023-05-15 NOTE — PATIENT INSTRUCTIONS
Patient was educated on left shoulder sprain  Patient was educated on heating left shoulder  Patient was educated on pendulums and wall climbs  Patient was educated on taking OTC Tylenol and anti-inflammatories for pain  Patient was given a referral to ortho  Shoulder Sprain   WHAT YOU NEED TO KNOW:   A shoulder sprain happens when a ligament in your shoulder is stretched or torn  Ligaments are the tough tissues that connect bones  Ligaments allow you to lift, lower, and rotate your arm  DISCHARGE INSTRUCTIONS:   Return to the emergency department if:   You feel severe pain in your shoulder when you move it, or it is touched  Your skin feels cold or clammy  You have numbness, tingling, or a feeling of pins and needles in your shoulder  The skin on your injured shoulder looks blue or pale  Call your doctor if:   You have new or increased swelling and pain in your shoulder  You have new or increased stiffness when you move your injured shoulder  Your symptoms do not improve within 5 to 7 days  You have questions or concerns about your condition or care  Medicines: You may need any of the following:  Acetaminophen  decreases pain and fever  It is available without a doctor's order  Ask how much to take and how often to take it  Follow directions  Read the labels of all other medicines you are using to see if they also contain acetaminophen, or ask your doctor or pharmacist  Acetaminophen can cause liver damage if not taken correctly  NSAIDs , such as ibuprofen, help decrease swelling, pain, and fever  This medicine is available with or without a doctor's order  NSAIDs can cause stomach bleeding or kidney problems in certain people  If you take blood thinner medicine, always ask your healthcare provider if NSAIDs are safe for you  Always read the medicine label and follow directions  Prescription pain medicine  may be given   Ask your healthcare provider how to take this medicine safely  Some prescription pain medicines contain acetaminophen  Do not take other medicines that contain acetaminophen without talking to your healthcare provider  Too much acetaminophen may cause liver damage  Prescription pain medicine may cause constipation  Ask your healthcare provider how to prevent or treat constipation  Take your medicine as directed  Contact your healthcare provider if you think your medicine is not helping or if you have side effects  Tell your provider if you are allergic to any medicine  Keep a list of the medicines, vitamins, and herbs you take  Include the amounts, and when and why you take them  Bring the list or the pill bottles to follow-up visits  Carry your medicine list with you in case of an emergency  Follow up with your doctor as directed:  Write down your questions so you remember to ask them during your visits  Self-care:   Rest  your shoulder so it can heal  Avoid moving your shoulder as your injury heals  This will help decrease the risk of more damage to your shoulder  Apply ice  on your shoulder for 20 to 30 minutes every 2 hours or as directed  Use an ice pack, or put crushed ice in a plastic bag  Cover it with a towel before you apply it to your shoulder  Ice helps prevent tissue damage and decreases swelling and pain  Compress your shoulder as directed  Compression provides support and helps decrease swelling and movement so your shoulder can heal  For mild sprains, you may be given a sling to support your arm  You may need a padded brace or a plaster cast to hold your shoulder in place if the sprain is more serious  How to wear a brace, sling, or splint:  A brace, sling, or splint may be needed to limit your movement and protect your injured shoulder  Wear your brace, sling, or splint as directed  You may need to wear it all the time and take it off only to bathe or do exercises   Ask your healthcare provider how long you should wear it     Keep your skin clean and dry  Padding under your armpit will help absorb sweat and prevent sores on your skin  Do not hunch your shoulders  This may cause pain  Keep your shoulders relaxed  Position the sling over your arm and hand so that it also covers your knuckles  This will help the sling support your wrist and hand  Position your wrist higher than your elbow  Your wrist may start to hurt or go numb if your sling is too short  Physical therapy:  A physical therapist teaches you exercises to help improve movement and strength, and to decrease pain  Prevent another injury:   Do not exercise when you are tired or in pain  Warm up and stretch before you exercise  Wear equipment to protect yourself when you play sports  Wear shoes that fit well and run on flat surfaces to prevent falls  © Copyright [employer] 2022 Information is for End User's use only and may not be sold, redistributed or otherwise used for commercial purposes  The above information is an  only  It is not intended as medical advice for individual conditions or treatments  Talk to your doctor, nurse or pharmacist before following any medical regimen to see if it is safe and effective for you

## 2023-05-15 NOTE — PROGRESS NOTES
[address]        NAME: Miranda Boyd is a 59 y o  male  : 1960    MRN: [id number]  DATE: May 15, 2023  TIME: 11:01 AM    Assessment and Plan   Strain of left shoulder, initial encounter [S41 [id number]]  1  Strain of left shoulder, initial encounter  XR shoulder 2+ vw left    Ambulatory Referral to Orthopedic Surgery          Xray of left shoulder- No acute fracture or dislocations pending radiology report    Patient was educated on high blood pressure  Patient Instructions   Patient was educated on left shoulder sprain  Patient was educated on heating left shoulder  Patient was educated on pendulums and wall climbs  Patient was educated on taking OTC Tylenol and anti-inflammatories for pain  Patient was given a referral to ortho  Chief Complaint     Chief Complaint   Patient presents with   • Shoulder Pain     PT presents with left shoulder pain that travels into the arm after falling on concrete yesterday late afternoon  Pt denies any other injuries and remember the fall  PT took Aleve this morning but states minimal pain relief  History of Present Illness       Patient is here today complaining of left shoulder pain  Patient reports on 23 he was washing his canoe and slipped hitting his left shoulder  Patient reports left shoulder pain increases with movement  Patient is currently taking Naprosyn for pain  Patient reports no recent prior left shoulder injuries  Review of Systems   Review of Systems   Constitutional: Negative  Respiratory: Negative  Cardiovascular: Negative  Musculoskeletal:        Left shoulder pain   Psychiatric/Behavioral: Negative            Current Medications       Current Outpatient Medications:   •  amLODIPine (NORVASC) 10 mg tablet, Take 1 tablet (10 mg total) by mouth daily, Disp: 90 tablet, Rfl: 0  •  hydrochlorothiazide (HYDRODIURIL) 25 mg tablet, Take 1 tablet (25 mg total) by mouth daily, Disp: 90 tablet, Rfl: 1  •  traZODone "(DESYREL) 100 mg tablet, Take 1 tablet (100 mg total) by mouth daily at bedtime, Disp: 90 tablet, Rfl: 3  •  calcipotriene (DOVONEX) 0 005 % cream, Apply topically 2 (two) times a day For 5 days  , Disp: 60 g, Rfl: 0  •  fluorouracil (EFUDEX) 5 % cream, Apply topically 2 (two) times a day, Disp: 40 g, Rfl: 0  •  Sodium Sulfate-Mag Sulfate-KCl (Sutab) 6063-162-857 MG TABS, As directed, Disp: 24 tablet, Rfl: 0    Current Allergies     Allergies as of 05/15/2023   • (No Known Allergies)            The following portions of the patient's history were reviewed and updated as appropriate: allergies, current medications, past family history, past medical history, past social history, past surgical history and problem list      Past Medical History:   Diagnosis Date   • Hypertension    • Kidney stone    • Skin cancer    • Gibran-Parkinson-White (WPW) syndrome        Past Surgical History:   Procedure Laterality Date   • BASAL CELL CARCINOMA EXCISION     • CARDIAC ELECTROPHYSIOLOGY MAPPING AND ABLATION     • CYSTOSCOPY W/ LASER LITHOTRIPSY     • HERNIA REPAIR     • SQUAMOUS CELL CARCINOMA EXCISION     • WISDOM TOOTH EXTRACTION         Family History   Problem Relation Age of Onset   • Colon cancer Mother    • Uterine cancer Mother    • Skin cancer Mother          Medications have been verified  Objective   /82   Pulse 77   Temp 98 4 °F (36 9 °C)   Resp 18   Ht 5' 9\" (1 753 m)   Wt 82 1 kg (181 lb)   SpO2 98%   BMI 26 73 kg/m²   No LMP for male patient  Physical Exam     Physical Exam  Vitals and nursing note reviewed  Constitutional:       Appearance: Normal appearance  HENT:      Head: Normocephalic  Cardiovascular:      Rate and Rhythm: Normal rate and regular rhythm  Heart sounds: Normal heart sounds  Pulmonary:      Breath sounds: Normal breath sounds  Musculoskeletal:      Comments: Left shoulder flexion 180 degrees with pain over head   Left shoulder abduction 90 degrees before " pain  NO pain with resisted left shoulder internal and external rotation  Pain with empty can test in left shoulder  Positive Apprehension test in left shoulder   Neurological:      General: No focal deficit present  Mental Status: He is alert and oriented to person, place, and time     Psychiatric:         Mood and Affect: Mood normal          Behavior: Behavior normal

## 2023-05-17 ENCOUNTER — OFFICE VISIT (OUTPATIENT)
Dept: OBGYN CLINIC | Facility: CLINIC | Age: 63
End: 2023-05-17

## 2023-05-17 VITALS
DIASTOLIC BLOOD PRESSURE: 88 MMHG | WEIGHT: 183 LBS | SYSTOLIC BLOOD PRESSURE: 174 MMHG | HEART RATE: 72 BPM | BODY MASS INDEX: 27.11 KG/M2 | HEIGHT: 69 IN

## 2023-05-17 DIAGNOSIS — M25.512 ACUTE PAIN OF LEFT SHOULDER: Primary | ICD-10-CM

## 2023-05-17 NOTE — PROGRESS NOTES
Assessment:     1  Acute pain of left shoulder        Plan:     Problem List Items Addressed This Visit        Other    Acute pain of left shoulder - Primary     Finding is consistent with left shoulder injury, concern for rotator cuff tear  Findings and treatment options were discussed with the patient  X-rays were reviewed with him  Patient has weakness with resisted abduction so we will order an MRI of the left shoulder to further evaluate the joint  He was also given a prescription for formal physical therapy in case he cannot get the MRI approved right away  He is to continue icing and naproxen as needed for pain  He was given a note to restrict any lifting away from his body or overhead with that arm  Follow-up after MRI and I will go over further treat recommendations at that time  All patient's questions were answered to his satisfaction  This note is created using dictation transcription  It may contain typographical errors, grammatical errors, improperly dictated words, background noise and other errors  Relevant Orders    MRI shoulder left wo contrast    Ambulatory Referral to Physical Therapy      Subjective:     Patient ID: Sylvie Huddleston is a 59 y o  male  Chief Complaint: This is a right-hand-dominant 45-year-old white male here for evaluation of his left shoulder  Patient is referred here by Justin Wooten PA-C  on May 14, 2023 he was jumping down from his truck bed to the ground after he unloaded his canoe  He states he slipped and he landed on his outstretched left hand  He felt immediate pain in the left shoulder at that time  He had difficulty moving it at that time  He was seen at urgent care the next day and x-rays were taken  He was referred to orthopedics  He states that his range of motion has been slowly improving since then  He has been taking naproxen for pain which has helped as well  He states he is unable to lift anything heavy due to discomfort    He "denies any significant weakness  No prior injury to the left shoulder  Patient intake form reviewed  Allergy:  No Known Allergies  Medications:  all current active meds have been reviewed  Past Medical History:  Past Medical History:   Diagnosis Date   • Hypertension    • Kidney stone    • Skin cancer    • Gibran-Parkinson-White (WPW) syndrome      Past Surgical History:  Past Surgical History:   Procedure Laterality Date   • BASAL CELL CARCINOMA EXCISION     • CARDIAC ELECTROPHYSIOLOGY MAPPING AND ABLATION     • CYSTOSCOPY W/ LASER LITHOTRIPSY     • HERNIA REPAIR     • SQUAMOUS CELL CARCINOMA EXCISION     • WISDOM TOOTH EXTRACTION       Family History:  Family History   Problem Relation Age of Onset   • Colon cancer Mother    • Uterine cancer Mother    • Skin cancer Mother      Social History:  Social History     Substance and Sexual Activity   Alcohol Use Yes   • Alcohol/week: 15 0 standard drinks   • Types: 15 Cans of beer per week     Social History     Substance and Sexual Activity   Drug Use Never     Social History     Tobacco Use   Smoking Status Former   • Types: Cigarettes   • Quit date:    • Years since quittin 3   Smokeless Tobacco Never     Review of Systems   Constitutional: Negative  HENT: Negative  Eyes: Negative  Respiratory: Negative  Cardiovascular: Negative  Gastrointestinal: Negative  Endocrine: Negative  Genitourinary: Negative  Musculoskeletal: Positive for arthralgias (Left shoulder)  Negative for neck pain  Skin: Negative  Allergic/Immunologic: Negative  Neurological: Negative  Hematological: Negative  Psychiatric/Behavioral: Negative  Objective:  BP Readings from Last 1 Encounters:   23 (!) 174/88      Wt Readings from Last 1 Encounters:   23 83 kg (183 lb)      BMI:   Estimated body mass index is 27 02 kg/m² as calculated from the following:    Height as of this encounter: 5' 9\" (1 753 m)      Weight as of this " "encounter: 83 kg (183 lb)  BSA:   Estimated body surface area is 1 99 meters squared as calculated from the following:    Height as of this encounter: 5' 9\" (1 753 m)  Weight as of this encounter: 83 kg (183 lb)  Physical Exam  Vitals and nursing note reviewed  Constitutional:       General: He is not in acute distress  Appearance: Normal appearance  He is well-developed  HENT:      Head: Normocephalic and atraumatic  Right Ear: External ear normal       Left Ear: External ear normal    Eyes:      Extraocular Movements: Extraocular movements intact  Conjunctiva/sclera: Conjunctivae normal    Pulmonary:      Effort: Pulmonary effort is normal  No respiratory distress  Musculoskeletal:      Cervical back: Neck supple  Skin:     General: Skin is warm and dry  Neurological:      Mental Status: He is alert and oriented to person, place, and time  Psychiatric:         Mood and Affect: Mood normal          Behavior: Behavior normal        Left Shoulder Exam     Tenderness   The patient is experiencing no tenderness  Range of Motion   Active abduction: normal Left shoulder active abduction: pain  Forward flexion: normal Left shoulder forward flexion: pain  Internal rotation 0 degrees:  Lumbar (pain) abnormal     Muscle Strength   Abduction: 3/5   Internal rotation: 5/5   External rotation: 5/5   Biceps: 5/5     Tests   Gutierrez test: positive  Cross arm: negative  Impingement: negative  Drop arm: negative    Other   Erythema: absent  Scars: absent  Sensation: normal  Pulse: present     Comments:  Pain with resisted abduction with weakness  Discomfort with empty can  Negative speeds            I have personally reviewed pertinent films in PACS and my interpretation is X-rays of the left shoulder reveal no bony abnormalities  No soft tissue calcifications       Scribe Attestation    I,:  Alice Multani PA-C am acting as a scribe while in the presence of the attending physician :       I,:  " Jaya Camp MD personally performed the services described in this documentation    as scribed in my presence :

## 2023-05-17 NOTE — LETTER
"    Obstructive Sleep Apnea - PAP Follow-Up Visit:    Chief Complaint   Patient presents with     CPAP Follow Up     Follow up roberto        Raul Younger comes in today for follow-up of their mild sleep apnea, managed with CPAP.     His sleep study from 2007 showed an apnea-hypopnea index of 11.4 per hour and RDI of 25.5 per hour.     Overall, he rates the experience with PAP as 10 (0 poor, 10 great). The mask is comfortable. The mask is not leaking.  He is not snoring with the mask on. He is not having gasp arousals.  He is not having significant oral/nasal dryness. The pressure settings are comfortable.     He uses nasal pillows.     Bedtime is typically 10:30 pm. Usually it takes about 10 minutes to fall asleep with the mask on. Wake time is typically 6 am.  Patient is using PAP therapy 7 hours per night. The patient is usually getting 7-8 hours of sleep per night.    He does feel rested in the morning.    Total score - [place]: 8 (7/16/2018 11:33 AM)      [employer]ironics  CPAP 12 cmH2O download:  30/30 total days of use. 0 nonuse days.  Average use 7 hours 14 minutes per day.  100% days with >4 hours use.  Large leak 1 hrs 22 mins per day average.  AHI 1.1.         Reviewed by team: Allergies  Meds       Reviewed by provider:        Problem List:  Patient Active Problem List    Diagnosis Date Noted     RA (rheumatoid arthritis) (H) 02/19/2015     Priority: Medium     2014. Treat with Pulse doses of Prednisone by Rheumatologist for \"exacerbations/flare-ups\"       History of colonic polyps 02/19/2015     Priority: Medium     Diagnosed 12/2011 due to for colonoscopy 2016  SPECIMEN(S):  Colon polyp ascending    FINAL DIAGNOSIS:  Ascending colon polyp, biopsy - Small fragment of mucosa without  diagnostic evidence of hyperplastic or adenomatous polyp. Negative for  dysplasia and malignancy.  Problem list name updated by automated process. Provider to review       Advanced directives, counseling/discussion 12/08/2011 " May 17, 2023     Patient: Ky Espinoza  YOB: 1960  Date of Visit: 5/17/2023      To Whom it May Concern:    Ky Espinoza is under my professional care  Mellissa Lennox was seen in my office on 5/17/2023  Mellissa Lennox cannot lift away from his body or overhead with his left arm  He can lift close to his body with no restrictions  If you have any questions or concerns, please don't hesitate to call           Sincerely,          Raphael oBo MD        CC: No Recipients "    Priority: Medium     Pt already has a HCD and will bring in a copy.     Shauna Alfredo Demetri MOSER         HYPERLIPIDEMIA LDL GOAL <130 10/31/2010     Priority: Medium     Prostate cancer (H) 01/26/2010     Priority: Medium     Cryosurgery  January 2011       Chronic rhinitis 06/02/2008     Priority: Medium     (Problem list name updated by automated process. Provider to review and confirm.)       Essential hypertension, benign 02/06/2003     Priority: Medium          /81  Pulse 56  Resp 16  Ht 1.753 m (5' 9\")  Wt 102.5 kg (226 lb)  SpO2 100%  BMI 33.37 kg/m2    Impression/Plan:     Mild sleep apnea.     - Tolerating PAP well. Daytime symptoms are stable. Regular compliance and normal AHI noted on download.     Plan:     1. Continue CPAP therapy     Raul Younger will follow up in about 1 year(s).     Fifteen minutes spent with patient, all of which were spent face-to-face counseling, consulting, coordinating plan of care.      Yoan Maria MD, MD    CC:  Jose D Vasquez,   "

## 2023-05-17 NOTE — ASSESSMENT & PLAN NOTE
Finding is consistent with left shoulder injury, concern for rotator cuff tear  Findings and treatment options were discussed with the patient  X-rays were reviewed with him  Patient has weakness with resisted abduction so we will order an MRI of the left shoulder to further evaluate the joint  He was also given a prescription for formal physical therapy in case he cannot get the MRI approved right away  He is to continue icing and naproxen as needed for pain  He was given a note to restrict any lifting away from his body or overhead with that arm  Follow-up after MRI and I will go over further treat recommendations at that time  All patient's questions were answered to his satisfaction  This note is created using dictation transcription  It may contain typographical errors, grammatical errors, improperly dictated words, background noise and other errors 
PROBLEM DIAGNOSES  Problem: Secondary and unspecified malignant neoplasm of lymph nodes of head, face, and neck  Assessment and Plan: Pt scheduled for surgery on 10/18/19.  Pre-op instructions provided. Pt verbalized understanding.   Pt to take own medication for GI ppx.   Pt given detailed verbal and written instructions on chlorhexidine wash. Pt verbalized understanding with teachback.     Problem: DM (diabetes mellitus)  Assessment and Plan: OR booking notified.   Pt instructed to hold metformin the night before and the morning of surgery.    Problem: HTN (hypertension)  Assessment and Plan: Pt instructed to take her lisinopril the morning of surgery.     Problem: Asthma  Assessment and Plan: Pt advised to continue medications as prescribed.    Problem: Sleep apnea  Assessment and Plan: OR booking notified.

## 2023-06-04 ENCOUNTER — HOSPITAL ENCOUNTER (OUTPATIENT)
Dept: RADIOLOGY | Facility: HOSPITAL | Age: 63
Discharge: HOME | End: 2023-06-04
Payer: COMMERCIAL

## 2023-06-04 ENCOUNTER — HOSPITAL ENCOUNTER (OUTPATIENT)
Dept: MRI IMAGING | Facility: HOSPITAL | Age: 63
Discharge: HOME | End: 2023-06-04
Payer: COMMERCIAL

## 2023-06-04 DIAGNOSIS — M25.512 ACUTE PAIN OF LEFT SHOULDER: ICD-10-CM

## 2023-06-04 PROCEDURE — G1004 CDSM NDSC: HCPCS

## 2023-06-04 PROCEDURE — 73221 MRI JOINT UPR EXTREM W/O DYE: CPT

## 2023-06-05 ENCOUNTER — TELEPHONE (OUTPATIENT)
Dept: OBGYN CLINIC | Facility: CLINIC | Age: 63
End: 2023-06-05

## 2023-06-05 NOTE — TELEPHONE ENCOUNTER
Caller: Patient     Doctor: Chetan Romo     Reason for call: Calling to see if MRI results were in yet , advised per chart results not in yet     Call back#: n/a [Initial Consultation] : an initial consultation for

## 2023-06-08 ENCOUNTER — OFFICE VISIT (OUTPATIENT)
Dept: OBGYN CLINIC | Facility: CLINIC | Age: 63
End: 2023-06-08
Payer: COMMERCIAL

## 2023-06-08 VITALS
BODY MASS INDEX: 27.11 KG/M2 | DIASTOLIC BLOOD PRESSURE: 84 MMHG | SYSTOLIC BLOOD PRESSURE: 148 MMHG | WEIGHT: 183 LBS | HEIGHT: 69 IN

## 2023-06-08 DIAGNOSIS — S46.912D STRAIN OF LEFT SHOULDER, SUBSEQUENT ENCOUNTER: Primary | ICD-10-CM

## 2023-06-08 PROBLEM — S46.912A LEFT SHOULDER STRAIN: Status: ACTIVE | Noted: 2023-06-08

## 2023-06-08 PROCEDURE — 99213 OFFICE O/P EST LOW 20 MIN: CPT | Performed by: PHYSICIAN ASSISTANT

## 2023-06-08 NOTE — ASSESSMENT & PLAN NOTE
Findings consistent with left shoulder strain  Findings and treatment options were discussed with the patient  MRI films were reviewed with him  MRI revealed no evidence of a full-thickness rotator cuff tear  Symptoms continue to improve  He is scheduled to start formal physical therapy next week  He is to avoid any repetitive activities with that arm  Follow-up in 6 weeks with Dr Ciara Escalante for reevaluation    All questions were answered to patient's satisfaction

## 2023-06-08 NOTE — PROGRESS NOTES
Assessment:     1  Strain of left shoulder, subsequent encounter        Plan:     Problem List Items Addressed This Visit        Musculoskeletal and Integument    Left shoulder strain - Primary     Findings consistent with left shoulder strain  Findings and treatment options were discussed with the patient  MRI films were reviewed with him  MRI revealed no evidence of a full-thickness rotator cuff tear  Symptoms continue to improve  He is scheduled to start formal physical therapy next week  He is to avoid any repetitive activities with that arm  Follow-up in 6 weeks with Dr Rosemary Alfred for reevaluation  All questions were answered to patient's satisfaction           Subjective:     Patient ID: Brooklyn Jessica is a 59 y o  male  Chief Complaint: This is a right-hand-dominant 79-year-old white male following up for left shoulder pain  Injury on May 14, 2023 he was jumping down from his truck bed to the ground after he unloaded his canoe  He states he slipped and he landed on his outstretched left hand  He states that since then his pain has significantly improved  He does continue to have some discomfort at end range of motion  He denies any weakness in the arm  He is here to review the MRI of his left shoulder      Allergy:  No Known Allergies  Medications:  all current active meds have been reviewed  Past Medical History:  Past Medical History:   Diagnosis Date   • Hypertension    • Kidney stone    • Skin cancer    • Gibran-Parkinson-White (WPW) syndrome      Past Surgical History:  Past Surgical History:   Procedure Laterality Date   • BASAL CELL CARCINOMA EXCISION     • CARDIAC ELECTROPHYSIOLOGY MAPPING AND ABLATION     • CYSTOSCOPY W/ LASER LITHOTRIPSY     • HERNIA REPAIR     • SQUAMOUS CELL CARCINOMA EXCISION     • WISDOM TOOTH EXTRACTION       Family History:  Family History   Problem Relation Age of Onset   • Colon cancer Mother    • Uterine cancer Mother    • Skin cancer Mother      Social History:  Social "History     Substance and Sexual Activity   Alcohol Use Yes   • Alcohol/week: 15 0 standard drinks of alcohol   • Types: 15 Cans of beer per week     Social History     Substance and Sexual Activity   Drug Use Never     Social History     Tobacco Use   Smoking Status Former   • Types: Cigarettes   • Quit date:    • Years since quittin 4   Smokeless Tobacco Never     Review of Systems   Constitutional: Negative  HENT: Negative  Eyes: Negative  Respiratory: Negative  Cardiovascular: Negative  Gastrointestinal: Negative  Endocrine: Negative  Genitourinary: Negative  Musculoskeletal: Positive for arthralgias (Left shoulder)  Negative for neck pain  Skin: Negative  Allergic/Immunologic: Negative  Neurological: Negative  Hematological: Negative  Psychiatric/Behavioral: Negative  Objective:  BP Readings from Last 1 Encounters:   23 148/84      Wt Readings from Last 1 Encounters:   23 83 kg (183 lb)      BMI:   Estimated body mass index is 27 02 kg/m² as calculated from the following:    Height as of this encounter: 5' 9\" (1 753 m)  Weight as of this encounter: 83 kg (183 lb)  BSA:   Estimated body surface area is 1 99 meters squared as calculated from the following:    Height as of this encounter: 5' 9\" (1 753 m)  Weight as of this encounter: 83 kg (183 lb)  Physical Exam  Vitals and nursing note reviewed  Constitutional:       General: He is not in acute distress  Appearance: Normal appearance  He is well-developed  HENT:      Head: Normocephalic and atraumatic  Right Ear: External ear normal       Left Ear: External ear normal    Eyes:      Extraocular Movements: Extraocular movements intact  Conjunctiva/sclera: Conjunctivae normal    Pulmonary:      Effort: Pulmonary effort is normal  No respiratory distress  Musculoskeletal:      Cervical back: Neck supple  Skin:     General: Skin is warm and dry     Neurological:      " Mental Status: He is alert and oriented to person, place, and time  Psychiatric:         Mood and Affect: Mood normal          Behavior: Behavior normal        Left Shoulder Exam     Tenderness   The patient is experiencing no tenderness  Range of Motion   Active abduction: normal Left shoulder active abduction: pain  Forward flexion: normal Left shoulder forward flexion: pain  Internal rotation 0 degrees:  Lumbar (pain) abnormal     Muscle Strength   Abduction: 4/5   Internal rotation: 5/5   External rotation: 5/5   Biceps: 5/5     Tests   Gutierrez test: positive  Cross arm: negative  Impingement: negative  Drop arm: negative    Other   Erythema: absent  Scars: absent  Sensation: normal  Pulse: present     Comments:  Pain with resisted abduction               I have personally reviewed pertinent films in PACS and my interpretation is MRI of the left shoulder reveals mild rotator cuff tendinosis  There is a small partial-thickness bursal surface tear of the subscapularis  No full-thickness rotator cuff tear  There is a degenerative posterior labral tear

## 2023-06-13 NOTE — PROGRESS NOTES
PT Evaluation     Today's date: 2023  Patient name: Martina Joseph  : 1960  MRN: [id number]  Referring provider: Rafael Tran PA-C  Dx:   Encounter Diagnosis     ICD-10-CM    1  Acute pain of left shoulder  M25 512                Assessment  Assessment details: Martina Joseph is a 59 y o  male who presents with pain, decreased strength, decreased ROM, decreased joint mobility and postural dysfunction  Due to these impairments, patient has difficulty performing ADL's, recreational activities, work-related activities, engaging in social activities, lifting/carrying, reaching  Patient's clinical presentation is consistent with their referring diagnosis of Acute pain of left shoulder  (primary encounter diagnosis)  Patient has been educated in home exercise program and plan of care  Patient would benefit from skilled physical therapy services to address their aforementioned functional limitations and progress towards prior level of function and independence with home exercise program    Impairments: abnormal or restricted ROM, abnormal movement, activity intolerance, impaired physical strength, lacks appropriate home exercise program, pain with function, poor posture  and poor body mechanics  Functional limitations: reach/lift overhead, carrying, reach behind back, carrying   Prognosis: good  Prognosis details: + factors: high motivation levels  - factors: co morbidities    Goals  Short Term Goals to be accomplished by 23:  STG1: Pt will be I with HEP and I with posture management   STG3: Pt will demo full 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 [address]  STG4: Pt will demo 5/5 MMT strength in shoulder to improve lift ability  STG5: Pt will report pain 1/10 in shoulder at worst, with all work activities           Plan  Plan details: HEP development, stretching, strengthening, A/AA/PROM, joint mobilizations, posture education, STM/MI as needed to reduce muscle tension, muscle reeducation, PLOC discussed and agreed upon with patient  Patient would benefit from: PT eval and skilled physical therapy  Planned modality interventions: cryotherapy, thermotherapy: hydrocollator packs and unattended electrical stimulation  Planned therapy interventions: home exercise program, therapeutic exercise, therapeutic activities, self care, patient education, manual therapy, neuromuscular re-education, joint mobilization, stretching, strengthening and flexibility  Frequency: 2x week  Duration in weeks: 12  Plan of Care beginning date: 2023  Plan of Care expiration date: 2023  Treatment plan discussed with: patient    Subjective Evaluation    History of Present Illness  Mechanism of injury: Pt is a 58 y/o male who presents to PT with L shoulder pain  Pain began May 14, 2023 when he was jumped down from his truck bed to the ground after he unloaded his canoe  He states he slipped and he landed on his outstretched left hand  He went to Urgent Care on 5/15/23 where x-rays were performed and demonstrated no osseus abnormality  He was referred to ortho where MRI was performed and demonstrated Mild supraspinatus, infraspinatus, and subscapularis tendinosis, Low-grade a small partial-thickness bursal surface tear of the superior distal fibers of the subscapularis tendon, No full-thickness rotator cuff tear, Minimal subacromial/subdeltoid bursitis, Mild bicipital tendinosis, Degenerative posterior labral tear  He was prescribed PT       Timber worker   Pain  Current pain ratin  At best pain ratin  At worst pain ratin  Location: L shoulder   Quality: tight, discomfort and pulling  Relieving factors: relaxation and rest  Aggravating factors: lifting, standing, sitting and overhead activity    Treatments  Current treatment: physical therapy  Patient Goals  Patient goals for therapy: increased motion, decreased pain, increased strength and independence with ADLs/IADLs  Patient goal: reach/lift overhead, reach behind back, Timber work, disturbed sleep     Objective     Postural Observations  Seated posture: poor  Standing posture: poor    Additional Postural Observation Details  Forward shoulders, forward head    Observations     Additional Observation Details  Shoulders Level     Tenderness     Additional Tenderness Details  TTP over L infraspinatus and teres minor    Active Range of Motion   Left Shoulder   Flexion: 130 degrees with pain  Abduction: 130 degrees with pain  External rotation 0°: 35 degrees with pain    Right Shoulder   Flexion: 160 degrees   Abduction: 160 degrees   External rotation 0°: 75 degrees     Strength/Myotome Testing     Left Shoulder     Planes of Motion   Flexion: 4-   Abduction: 4-   External rotation at 0°: 4-   External rotation at 45°: 4-     Right Shoulder     Planes of Motion   Flexion: 5   Abduction: 5   External rotation at 0°: 5   External rotation at 45°: 5     Tests     Left Shoulder   Positive Neer's  Negative drop arm and painful arc        Precautions:   Past Medical History:   Diagnosis Date   • Hypertension    • Kidney stone    • Skin cancer    • Gibran-Parkinson-White (WPW) syndrome        Manuals 6/14        STM RTC, UT, deltoid         PROM stretch                           Neuro Re-Ed 6/14        Row b/l         Sh ext b/l                                                       Ther Ex 6/14        Lat stretch  :30x2         Low doorway stretch :30x2         Horiz add stretch :30x2                                                     Ther Activity 6/14

## 2023-06-14 ENCOUNTER — EVALUATION (OUTPATIENT)
Dept: PHYSICAL THERAPY | Facility: CLINIC | Age: 63
End: 2023-06-14
Payer: COMMERCIAL

## 2023-06-14 DIAGNOSIS — M25.512 ACUTE PAIN OF LEFT SHOULDER: Primary | ICD-10-CM

## 2023-06-14 PROCEDURE — 97110 THERAPEUTIC EXERCISES: CPT | Performed by: PHYSICAL THERAPIST

## 2023-06-14 PROCEDURE — 97162 PT EVAL MOD COMPLEX 30 MIN: CPT | Performed by: PHYSICAL THERAPIST

## 2023-06-15 ENCOUNTER — TELEPHONE (OUTPATIENT)
Dept: FAMILY MEDICINE CLINIC | Facility: CLINIC | Age: 63
End: 2023-06-15

## 2023-06-15 NOTE — TELEPHONE ENCOUNTER
Confirmation Geovany Pena [id number]  Effective: 06/14/2023     Expires: 09/12/2023  Active  Referred From  [facility]: [id number]  Provider GI: [id number]  Tax OB: [id number]  [address] [place] [address]  FERMÍN DOBSON [id number]  [facility]  [facility]  Specialty: Not Available  Tier 1  Group [facility]: [id number]  Provider CS: [id number]  Tax MW: [id number]  This referral is valid at any location for the above group  Patient Info  Wilmer Ryan  [id number]  Male  1960  [address] [place] [id number]  Clinical Information  Place of Service  Office  Service Type  Medical Care  Diagnoses  4 H31 762 - pain in left shoulder  Procedures  4 08725 - unlisted evaluation and management service

## 2023-06-21 ENCOUNTER — OFFICE VISIT (OUTPATIENT)
Dept: PHYSICAL THERAPY | Facility: CLINIC | Age: 63
End: 2023-06-21
Payer: COMMERCIAL

## 2023-06-21 DIAGNOSIS — M25.512 ACUTE PAIN OF LEFT SHOULDER: Primary | ICD-10-CM

## 2023-06-21 PROCEDURE — 97110 THERAPEUTIC EXERCISES: CPT | Performed by: PHYSICAL THERAPIST

## 2023-06-21 PROCEDURE — 97140 MANUAL THERAPY 1/> REGIONS: CPT | Performed by: PHYSICAL THERAPIST

## 2023-06-21 NOTE — PROGRESS NOTES
"Daily Note     Today's date: 2023  Patient name: Bruce Cavazos  : 1960  MRN: [id number]  Referring provider: René Major PA-C  Dx:   Encounter Diagnosis     ICD-10-CM    1  Acute pain of left shoulder  M25 512              Subjective: Pt reported that \"I am definitely feeling better, but the overhead stuff is still causing a lot of pain  \"     Objective: See treatment diary below    Assessment: Tolerated treatment well  Patient demonstrated fatigue post treatment, exhibited good technique with therapeutic exercises and would benefit from continued PT  Plan: Continue per plan of care        Precautions:   Past Medical History:   Diagnosis Date   • Hypertension    • Kidney stone    • Skin cancer    • Gibran-Parkinson-White (WPW) syndrome        Manuals        STM RTC, UT, deltoid  JZ       PROM stretch  JZ                         Neuro Re-Ed        Row b/l         Sh ext b/l          Horiz abd                                              Ther Ex        Lat stretch  :30x2  :30x2        Low doorway stretch :30x2  :30x2       Horiz add stretch :30x2 :30x2        MRE Sh ER 90 90  3x10, LO 3x10        MRE Sh IR 90 90  3 x10        Reviewed and updated HEP  5'                          Ther Activity                                                                                     "

## 2023-06-24 DIAGNOSIS — F51.04 CHRONIC INSOMNIA: ICD-10-CM

## 2023-06-26 ENCOUNTER — OFFICE VISIT (OUTPATIENT)
Dept: PHYSICAL THERAPY | Facility: CLINIC | Age: 63
End: 2023-06-26
Payer: COMMERCIAL

## 2023-06-26 DIAGNOSIS — L57.0 ACTINIC KERATOSIS: ICD-10-CM

## 2023-06-26 DIAGNOSIS — M25.512 ACUTE PAIN OF LEFT SHOULDER: Primary | ICD-10-CM

## 2023-06-26 PROCEDURE — 97110 THERAPEUTIC EXERCISES: CPT | Performed by: PHYSICAL THERAPIST

## 2023-06-26 PROCEDURE — 97140 MANUAL THERAPY 1/> REGIONS: CPT | Performed by: PHYSICAL THERAPIST

## 2023-06-26 RX ORDER — TRAZODONE HYDROCHLORIDE 100 MG/1
100 TABLET ORAL
Qty: 90 TABLET | Refills: 0 | Status: SHIPPED | OUTPATIENT
Start: 2023-06-26

## 2023-06-26 RX ORDER — CALCIPOTRIENE 50 UG/G
CREAM TOPICAL
Qty: 60 G | Refills: 0 | Status: SHIPPED | OUTPATIENT
Start: 2023-06-26

## 2023-06-26 NOTE — PROGRESS NOTES
"Daily Note     Today's date: 2023  Patient name: Mona Gleason  : 1960  MRN: [id number]  Referring provider: Kevin Mayes PA-C  Dx:   Encounter Diagnosis     ICD-10-CM    1  Acute pain of left shoulder  M25 512              Subjective: Pt reported that \"I am getting better and better  \"    Objective: See treatment diary below    Assessment: Tolerated treatment well  Patient demonstrated fatigue post treatment, exhibited good technique with therapeutic exercises and would benefit from continued PT  Plan: Continue per plan of care        Precautions:   Past Medical History:   Diagnosis Date   • Hypertension    • Kidney stone    • Skin cancer    • Gibran-Parkinson-White (WPW) syndrome        Manuals       STM RTC, UT, deltoid  JZ JZ      PROM stretch  JZ JZ                        Neuro Re-Ed       Row b/l         Sh ext b/l          Horiz abd                                              Ther Ex       Lat stretch  :30x2  :30x2        Low doorway stretch :30x2  :30x2       Horiz add stretch :30x2 :30x2  :30x3       MRE Sh ER 90 90  3x10, LO 3x10  MRE 3x20         MRE Sh IR 90 90  3 x10  MRE 3x20       Reviewed and updated HEP  5'  5'       MRE sh ext from full flexion   MRE 2x10       U/l tricep at side   10/ 3x10                         Ther Activity                                                                                     "

## 2023-06-26 NOTE — TELEPHONE ENCOUNTER
Refills have been requested for the following medications:         traZODone (DESYREL) 100 mg tablet [Alyssia Rene]      Patient Comment: Please fill asap, forgot to order, ran out      Preferred pharmacy: [facility] #[id number] - [place] - [address]        My chart message send due for 6 month Office visit

## 2023-07-05 ENCOUNTER — APPOINTMENT (OUTPATIENT)
Dept: PHYSICAL THERAPY | Facility: CLINIC | Age: 63
End: 2023-07-05
Payer: COMMERCIAL

## 2023-07-12 ENCOUNTER — OFFICE VISIT (OUTPATIENT)
Dept: PHYSICAL THERAPY | Facility: CLINIC | Age: 63
End: 2023-07-12
Payer: COMMERCIAL

## 2023-07-12 DIAGNOSIS — M25.512 ACUTE PAIN OF LEFT SHOULDER: Primary | ICD-10-CM

## 2023-07-12 PROCEDURE — 97140 MANUAL THERAPY 1/> REGIONS: CPT | Performed by: PHYSICAL THERAPIST

## 2023-07-12 PROCEDURE — 97110 THERAPEUTIC EXERCISES: CPT | Performed by: PHYSICAL THERAPIST

## 2023-07-12 PROCEDURE — 97112 NEUROMUSCULAR REEDUCATION: CPT | Performed by: PHYSICAL THERAPIST

## 2023-07-12 NOTE — PROGRESS NOTES
Daily Note     Today's date: 2023  Patient name: Shruthi Piña  : 1960  MRN: [id number]  Referring provider: Joel Muñiz PA-C  Dx:   Encounter Diagnosis     ICD-10-CM    1. Acute pain of left shoulder  M25.512              Subjective: Pt reported that "I have been able to get back to work full time. 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 Patient demonstrated fatigue post treatment and exhibited good technique with therapeutic exercises. Demonstrated full understanding of HEP. No longer requires skilled outpatient PT. Plan: D/C.       Precautions:   Past Medical History:   Diagnosis Date   • Hypertension    • Kidney stone    • Skin cancer    • Gibran-Parkinson-White (WPW) syndrome        Manuals      STM RTC, UT, deltoid  JZ JZ JZ     PROM stretch  JZ JZ JZ                       Neuro Re-Ed      Row b/l    20/ 3x10      Sh ext b/l     10/ 3x10      Horiz abd     O 3x10      Sh ER 90 90    MRE 2x10 ea                                Ther Ex      Lat stretch  :30x2  :30x2   :30x3      Low doorway stretch :30x2  :30x2  :30x3      Horiz add stretch :30x2 :30x2  :30x3  :30x3      MRE Sh ER 90 90  3x10, LO 3x10  MRE 3x20         MRE Sh IR 90 90  3 x10  MRE 3x20       Reviewed and updated HEP  5'  5'       MRE sh ext from full flexion   MRE 2x10       U/l tricep at side   10/ 3x10       1/2 foam seated thoracic extension    2x10     Horiz add opp shoulder reach    2x10      Reviewed/updated HEP    10'                        Ther Activity

## 2023-07-20 ENCOUNTER — OFFICE VISIT (OUTPATIENT)
Dept: OBGYN CLINIC | Facility: CLINIC | Age: 63
End: 2023-07-20
Payer: COMMERCIAL

## 2023-07-20 VITALS
HEIGHT: 69 IN | BODY MASS INDEX: 26.36 KG/M2 | SYSTOLIC BLOOD PRESSURE: 140 MMHG | WEIGHT: 178 LBS | DIASTOLIC BLOOD PRESSURE: 70 MMHG

## 2023-07-20 DIAGNOSIS — F51.04 CHRONIC INSOMNIA: ICD-10-CM

## 2023-07-20 DIAGNOSIS — I10 PRIMARY HYPERTENSION: ICD-10-CM

## 2023-07-20 DIAGNOSIS — S46.912D STRAIN OF LEFT SHOULDER, SUBSEQUENT ENCOUNTER: Primary | ICD-10-CM

## 2023-07-20 PROCEDURE — 99213 OFFICE O/P EST LOW 20 MIN: CPT | Performed by: ORTHOPAEDIC SURGERY

## 2023-07-20 RX ORDER — TRAZODONE HYDROCHLORIDE 100 MG/1
100 TABLET ORAL
Qty: 90 TABLET | Refills: 0 | Status: CANCELLED | OUTPATIENT
Start: 2023-07-20

## 2023-07-20 RX ORDER — HYDROCHLOROTHIAZIDE 25 MG/1
25 TABLET ORAL DAILY
Qty: 90 TABLET | Refills: 0 | Status: SHIPPED | OUTPATIENT
Start: 2023-07-20

## 2023-07-20 NOTE — ASSESSMENT & PLAN NOTE
Findings consistent with left shoulder strain with bursal partial subscapularis tear. Findings and treatment options were discussed with the patient. He has improved with conservative treatment of physical therapy and NSAIDs. Recommend continuing the home exercises on a regular basis. He is to avoid heavy lifting, especially away from the body or overhead. Discussed with patient that partial tear can progress over time if his symptoms persist and he may need surgical interventions in the future. I would a few more weeks of rehabilitation and see if his symptoms resolve. If his symptoms persist, he should return for further evaluation and recommendation. All patient's questions were answered to his satisfaction. This note is created using dictation transcription. It may contain typographical errors, grammatical errors, improperly dictated words, background noise and other errors.

## 2023-07-20 NOTE — PROGRESS NOTES
Assessment:     1. Strain of left shoulder, subsequent encounter        Plan:     Problem List Items Addressed This Visit        Musculoskeletal and Integument    Left shoulder strain - Primary     Findings consistent with left shoulder strain with bursal partial subscapularis tear. Findings and treatment options were discussed with the patient. He has improved with conservative treatment of physical therapy and NSAIDs. Recommend continuing the home exercises on a regular basis. He is to avoid heavy lifting, especially away from the body or overhead. Discussed with patient that partial tear can progress over time if his symptoms persist and he may need surgical interventions in the future. I would a few more weeks of rehabilitation and see if his symptoms resolve. If his symptoms persist, he should return for further evaluation and recommendation. All patient's questions were answered to his satisfaction. This note is created using dictation transcription. It may contain typographical errors, grammatical errors, improperly dictated words, background noise and other errors. Subjective:     Patient ID: Esperanza Boateng is a 59 y.o. male. Chief Complaint: This is a right-hand-dominant 80-year-old white male following up for left shoulder strain with partial rotator cuff tear. Injury on May 14, 2023 he was jumping down from his truck bed to the ground after he unloaded his canoe. He states he slipped and he landed on his outstretched left hand. He completed formal physical therapy and does feel improvement. He does have occasional pain with certain movements of the arm. He also sometimes wakes up with pain if he lies on that side. He has been taking naproxen as needed. He denies any weakness.     Allergy:  No Known Allergies  Medications:  all current active meds have been reviewed  Past Medical History:  Past Medical History:   Diagnosis Date   • Hypertension    • Kidney stone    • Skin cancer    • Gibran-Parkinson-White (WPW) syndrome      Past Surgical History:  Past Surgical History:   Procedure Laterality Date   • BASAL CELL CARCINOMA EXCISION     • CARDIAC ELECTROPHYSIOLOGY MAPPING AND ABLATION     • CYSTOSCOPY W/ LASER LITHOTRIPSY     • HERNIA REPAIR     • SQUAMOUS CELL CARCINOMA EXCISION     • WISDOM TOOTH EXTRACTION       Family History:  Family History   Problem Relation Age of Onset   • Colon cancer Mother    • Uterine cancer Mother    • Skin cancer Mother      Social History:  Social History     Substance and Sexual Activity   Alcohol Use Yes   • Alcohol/week: 15.0 standard drinks of alcohol   • Types: 15 Cans of beer per week     Social History     Substance and Sexual Activity   Drug Use Never     Social History     Tobacco Use   Smoking Status Former   • Types: Cigarettes   • Quit date:    • Years since quittin.5   Smokeless Tobacco Never     Review of Systems   Constitutional: Negative. HENT: Negative. Eyes: Negative. Respiratory: Negative. Cardiovascular: Negative. Gastrointestinal: Negative. Endocrine: Negative. Genitourinary: Negative. Musculoskeletal: Negative for arthralgias (Left shoulder) and neck pain. Skin: Negative. Allergic/Immunologic: Negative. Neurological: Negative. Hematological: Negative. Psychiatric/Behavioral: Negative. Objective:  BP Readings from Last 1 Encounters:   23 140/70      Wt Readings from Last 1 Encounters:   23 80.7 kg (178 lb)      BMI:   Estimated body mass index is 26.29 kg/m² as calculated from the following:    Height as of this encounter: 5' 9" (1.753 m). Weight as of this encounter: 80.7 kg (178 lb). BSA:   Estimated body surface area is 1.97 meters squared as calculated from the following:    Height as of this encounter: 5' 9" (1.753 m). Weight as of this encounter: 80.7 kg (178 lb). Physical Exam  Vitals and nursing note reviewed.    Constitutional:       General: He is not in acute distress. Appearance: Normal appearance. He is well-developed. HENT:      Head: Normocephalic and atraumatic. Right Ear: External ear normal.      Left Ear: External ear normal.   Eyes:      Extraocular Movements: Extraocular movements intact. Conjunctiva/sclera: Conjunctivae normal.   Pulmonary:      Effort: Pulmonary effort is normal. No respiratory distress. Musculoskeletal:      Cervical back: Neck supple. Skin:     General: Skin is warm and dry. Neurological:      Mental Status: He is alert and oriented to person, place, and time. Psychiatric:         Mood and Affect: Mood normal.         Behavior: Behavior normal.       Left Shoulder Exam     Tenderness   The patient is experiencing no tenderness. Range of Motion   Active abduction: normal   Forward flexion: normal   Internal rotation 0 degrees:  Lumbar abnormal     Muscle Strength   Abduction: 5/5   Internal rotation: 5/5   External rotation: 5/5   Biceps: 5/5     Tests   Cross arm: negative  Impingement: negative  Drop arm: negative    Other   Erythema: absent  Scars: absent  Sensation: normal  Pulse: present     Comments:  Mild discomfort with resisted abduction            No new imaging.      Scribe Attestation    I,:  Dayna Mcdaniel PA-C am acting as a scribe while in the presence of the attending physician.:       I,:  Caio Caldwell MD personally performed the services described in this documentation    as scribed in my presence.:

## 2023-07-20 NOTE — TELEPHONE ENCOUNTER
Refills have been requested for the following medications:         hydrochlorothiazide (HYDRODIURIL) 25 mg tablet Diane Rene]     Preferred pharmacy: [facility]/PHARMACY #[id number] - [place] - [address].

## 2023-07-21 DIAGNOSIS — I10 HYPERTENSION, UNSPECIFIED TYPE: ICD-10-CM

## 2023-07-21 DIAGNOSIS — I10 PRIMARY HYPERTENSION: ICD-10-CM

## 2023-07-21 RX ORDER — AMLODIPINE BESYLATE 10 MG/1
10 TABLET ORAL DAILY
Qty: 90 TABLET | Refills: 0 | Status: SHIPPED | OUTPATIENT
Start: 2023-07-21

## 2023-07-21 NOTE — TELEPHONE ENCOUNTER
Refills have been requested for the following medications:         amLODIPine (NORVASC) 10 mg tablet [Alyssia Rene]      Patient Comment: One dise left.     Preferred pharmacy: [facility]/PHARMACY #[id number] - [place] - [phone]

## 2023-07-24 RX ORDER — HYDROCHLOROTHIAZIDE 25 MG/1
25 TABLET ORAL DAILY
Qty: 90 TABLET | Refills: 0 | OUTPATIENT
Start: 2023-07-24

## 2023-08-03 ENCOUNTER — OFFICE VISIT (OUTPATIENT)
Dept: OBGYN CLINIC | Facility: CLINIC | Age: 63
End: 2023-08-03
Payer: COMMERCIAL

## 2023-08-03 VITALS
DIASTOLIC BLOOD PRESSURE: 74 MMHG | HEIGHT: 69 IN | WEIGHT: 176 LBS | SYSTOLIC BLOOD PRESSURE: 138 MMHG | BODY MASS INDEX: 26.07 KG/M2

## 2023-08-03 DIAGNOSIS — S46.912D STRAIN OF LEFT SHOULDER, SUBSEQUENT ENCOUNTER: Primary | ICD-10-CM

## 2023-08-03 PROCEDURE — 99213 OFFICE O/P EST LOW 20 MIN: CPT | Performed by: ORTHOPAEDIC SURGERY

## 2023-08-03 PROCEDURE — 20610 DRAIN/INJ JOINT/BURSA W/O US: CPT | Performed by: ORTHOPAEDIC SURGERY

## 2023-08-03 RX ORDER — BETAMETHASONE SODIUM PHOSPHATE AND BETAMETHASONE ACETATE 3; 3 MG/ML; MG/ML
6 INJECTION, SUSPENSION INTRA-ARTICULAR; INTRALESIONAL; INTRAMUSCULAR; SOFT TISSUE
Status: COMPLETED | OUTPATIENT
Start: 2023-08-03 | End: 2023-08-03

## 2023-08-03 RX ADMIN — BETAMETHASONE SODIUM PHOSPHATE AND BETAMETHASONE ACETATE 6 MG: 3; 3 INJECTION, SUSPENSION INTRA-ARTICULAR; INTRALESIONAL; INTRAMUSCULAR; SOFT TISSUE at 11:45

## 2023-08-03 NOTE — ASSESSMENT & PLAN NOTE
Findings consistent with left shoulder strain with bursal partial subscapularis tear. Findings and treatment options were discussed with the patient. He was given subacromial cortisone injection today, tolerated well, cold compress today. Can repeat in 3-4 months if needed. maintain HEP as shown by physical therapy, nsaids as needed. He is to avoid heavy lifting, especially away from the body or overhead. Discussed with patient that partial tear can progress over time if his symptoms persist and he may need surgical interventions in the future. see back as needed. All patient's questions were answered to his satisfaction. This note is created using dictation transcription. It may contain typographical errors, grammatical errors, improperly dictated words, background noise and other errors.

## 2023-08-03 NOTE — PROGRESS NOTES
Assessment:     1. Strain of left shoulder, subsequent encounter        Plan:     Problem List Items Addressed This Visit        Musculoskeletal and Integument    Left shoulder strain - Primary     Findings consistent with left shoulder strain with bursal partial subscapularis tear. Findings and treatment options were discussed with the patient. He was given subacromial cortisone injection today, tolerated well, cold compress today. Can repeat in 3-4 months if needed. maintain HEP as shown by physical therapy, nsaids as needed. He is to avoid heavy lifting, especially away from the body or overhead. Discussed with patient that partial tear can progress over time if his symptoms persist and he may need surgical interventions in the future. see back as needed. All patient's questions were answered to his satisfaction. This note is created using dictation transcription. It may contain typographical errors, grammatical errors, improperly dictated words, background noise and other errors. Relevant Orders    Large joint arthrocentesis: L subacromial bursa       Subjective:     Patient ID: Hardeep Kent is a 59 y.o. male. Chief Complaint: This is a right-hand-dominant 27-year-old white male following up for left shoulder strain with partial rotator cuff tear. Injury on May 14, 2023 he was jumping down from his truck bed to the ground after he unloaded his canoe. He states he slipped and he landed on his outstretched left hand. He completed formal physical therapy. He states past few weeks he is only getting 2-3 hrs of sleep due to pain. 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 He has been taking naproxen as needed. He is requesting cortisone injection today.      Allergy:  No Known Allergies  Medications:  all current active meds have been reviewed  Past Medical History:  Past Medical History:   Diagnosis Date   • Hypertension    • Kidney stone    • Skin cancer    • Gibran-Parkinson-White (WPW) syndrome      Past Surgical History:  Past Surgical History:   Procedure Laterality Date   • BASAL CELL CARCINOMA EXCISION     • CARDIAC ELECTROPHYSIOLOGY MAPPING AND ABLATION     • CYSTOSCOPY W/ LASER LITHOTRIPSY     • HERNIA REPAIR     • SQUAMOUS CELL CARCINOMA EXCISION     • WISDOM TOOTH EXTRACTION       Family History:  Family History   Problem Relation Age of Onset   • Colon cancer Mother    • Uterine cancer Mother    • Skin cancer Mother      Social History:  Social History     Substance and Sexual Activity   Alcohol Use Yes   • Alcohol/week: 15.0 standard drinks of alcohol   • Types: 15 Cans of beer per week     Social History     Substance and Sexual Activity   Drug Use Never     Social History     Tobacco Use   Smoking Status Former   • Types: Cigarettes   • Quit date:    • Years since quittin.5   Smokeless Tobacco Never     Review of Systems   Constitutional: Negative. HENT: Negative. Eyes: Negative. Respiratory: Negative. Cardiovascular: Negative. Gastrointestinal: Negative. Endocrine: Negative. Genitourinary: Negative. Musculoskeletal: Negative for arthralgias (Left shoulder) and neck pain. Skin: Negative. Allergic/Immunologic: Negative. Neurological: Negative. Hematological: Negative. Psychiatric/Behavioral: Negative. Objective:  BP Readings from Last 1 Encounters:   23 138/74      Wt Readings from Last 1 Encounters:   23 79.8 kg (176 lb)      BMI:   Estimated body mass index is 25.99 kg/m² as calculated from the following:    Height as of this encounter: 5' 9" (1.753 m). Weight as of this encounter: 79.8 kg (176 lb). BSA:   Estimated body surface area is 1.96 meters squared as calculated from the following:    Height as of this encounter: 5' 9" (1.753 m). Weight as of this encounter: 79.8 kg (176 lb). Physical Exam  Vitals and nursing note reviewed. Constitutional:       General: He is not in acute distress. Appearance: Normal appearance. He is well-developed. HENT:      Head: Normocephalic and atraumatic. Right Ear: External ear normal.      Left Ear: External ear normal.   Eyes:      Extraocular Movements: Extraocular movements intact. Conjunctiva/sclera: Conjunctivae normal.   Pulmonary:      Effort: Pulmonary effort is normal. No respiratory distress. Musculoskeletal:      Cervical back: Neck supple. Skin:     General: Skin is warm and dry. Neurological:      Mental Status: He is alert and oriented to person, place, and time. Psychiatric:         Mood and Affect: Mood normal.         Behavior: Behavior normal.       Left Shoulder Exam     Tenderness   The patient is experiencing no tenderness. Range of Motion   Active abduction: normal Left shoulder active abduction: pain. Forward flexion: normal Left shoulder forward flexion: pain. Internal rotation 0 degrees:  Lumbar abnormal     Muscle Strength   Abduction: 5/5   Internal rotation: 5/5   External rotation: 5/5   Biceps: 5/5     Tests   Cross arm: negative  Impingement: negative  Drop arm: negative    Other   Erythema: absent  Scars: absent  Sensation: normal  Pulse: present     Comments:  Mild discomfort with resisted abduction            No new imaging. Large joint arthrocentesis: L subacromial bursa  Universal Protocol:  Consent: Verbal consent obtained.   Risks and benefits: risks, benefits and alternatives were discussed  Consent given by: patient  Patient understanding: patient states understanding of the procedure being performed  Site marked: the operative site was marked  Patient identity confirmed: verbally with patient    Supporting Documentation  Indications: pain   Procedure Details  Location: shoulder - L subacromial bursa  Preparation: Patient was prepped and draped in the usual sterile fashion  Needle size: 22 G  Ultrasound guidance: no  Approach: posterolateral  Medications administered: 6 mg betamethasone acetate-betamethasone sodium phosphate 6 (3-3) mg/mL (5 ML 0.25% marcaine injection infiltration )    Patient tolerance: patient tolerated the procedure well with no immediate complications  Dressing:  Sterile dressing applied        Scribe Attestation    I,:  Zeinab Yi am acting as a scribe while in the presence of the attending physician.:       I,:  Artur Downing MD personally performed the services described in this documentation    as scribed in my presence.:

## 2023-09-15 DIAGNOSIS — I10 HYPERTENSION, UNSPECIFIED TYPE: ICD-10-CM

## 2023-09-15 DIAGNOSIS — F51.04 CHRONIC INSOMNIA: ICD-10-CM

## 2023-09-15 DIAGNOSIS — I10 PRIMARY HYPERTENSION: ICD-10-CM

## 2023-09-15 RX ORDER — TRAZODONE HYDROCHLORIDE 100 MG/1
100 TABLET ORAL
Qty: 90 TABLET | Refills: 0 | Status: SHIPPED | OUTPATIENT
Start: 2023-09-15 | End: 2023-09-18 | Stop reason: SDUPTHER

## 2023-09-15 RX ORDER — AMLODIPINE BESYLATE 10 MG/1
10 TABLET ORAL DAILY
Qty: 90 TABLET | Refills: 0 | Status: SHIPPED | OUTPATIENT
Start: 2023-09-15 | End: 2023-09-15 | Stop reason: SDUPTHER

## 2023-09-18 RX ORDER — TRAZODONE HYDROCHLORIDE 100 MG/1
100 TABLET ORAL
Qty: 90 TABLET | Refills: 0 | Status: SHIPPED | OUTPATIENT
Start: 2023-09-18

## 2023-09-18 RX ORDER — AMLODIPINE BESYLATE 10 MG/1
10 TABLET ORAL DAILY
Qty: 90 TABLET | Refills: 0 | Status: SHIPPED | OUTPATIENT
Start: 2023-09-18

## 2023-09-18 RX ORDER — HYDROCHLOROTHIAZIDE 25 MG/1
25 TABLET ORAL DAILY
Qty: 90 TABLET | Refills: 0 | Status: SHIPPED | OUTPATIENT
Start: 2023-09-18

## 2023-10-18 DIAGNOSIS — F51.04 CHRONIC INSOMNIA: ICD-10-CM

## 2023-10-18 RX ORDER — TRAZODONE HYDROCHLORIDE 100 MG/1
100 TABLET ORAL
Qty: 90 TABLET | Refills: 0 | Status: CANCELLED | OUTPATIENT
Start: 2023-10-18

## 2023-10-20 NOTE — TELEPHONE ENCOUNTER
Patient aware due for office visit, read [employer] message  Last read by Zaid Reed at 10:08 AM on 10/19/2023.

## 2023-10-24 NOTE — TELEPHONE ENCOUNTER
Called Pt. Advised due for office visit and to schedule.  Provided call back #   Sent a backup letter to patient's mailing address saying due for appt and trying to reach patient and provided call back to schedule, due Annual Physical on or after   11/16/2023

## 2023-10-27 DIAGNOSIS — I10 PRIMARY HYPERTENSION: ICD-10-CM

## 2023-10-27 DIAGNOSIS — I10 HYPERTENSION, UNSPECIFIED TYPE: ICD-10-CM

## 2023-10-27 RX ORDER — AMLODIPINE BESYLATE 10 MG/1
10 TABLET ORAL DAILY
Qty: 90 TABLET | Refills: 0 | Status: SHIPPED | OUTPATIENT
Start: 2023-10-27

## 2023-10-27 RX ORDER — HYDROCHLOROTHIAZIDE 25 MG/1
25 TABLET ORAL DAILY
Qty: 90 TABLET | Refills: 0 | Status: SHIPPED | OUTPATIENT
Start: 2023-10-27

## 2023-11-17 ENCOUNTER — OFFICE VISIT (OUTPATIENT)
Dept: FAMILY MEDICINE CLINIC | Facility: CLINIC | Age: 63
End: 2023-11-17
Payer: COMMERCIAL

## 2023-11-17 VITALS
TEMPERATURE: 97.7 F | WEIGHT: 174 LBS | SYSTOLIC BLOOD PRESSURE: 140 MMHG | OXYGEN SATURATION: 97 % | DIASTOLIC BLOOD PRESSURE: 80 MMHG | HEIGHT: 69 IN | HEART RATE: 78 BPM | BODY MASS INDEX: 25.77 KG/M2

## 2023-11-17 DIAGNOSIS — F51.04 CHRONIC INSOMNIA: ICD-10-CM

## 2023-11-17 DIAGNOSIS — K42.0 UMBILICAL HERNIA WITH OBSTRUCTION, WITHOUT GANGRENE: ICD-10-CM

## 2023-11-17 DIAGNOSIS — K42.9 UMBILICAL HERNIA WITHOUT OBSTRUCTION AND WITHOUT GANGRENE: ICD-10-CM

## 2023-11-17 DIAGNOSIS — Z23 ENCOUNTER FOR IMMUNIZATION: ICD-10-CM

## 2023-11-17 DIAGNOSIS — I10 HYPERTENSION, UNSPECIFIED TYPE: ICD-10-CM

## 2023-11-17 DIAGNOSIS — C44.41 BASAL CELL CARCINOMA (BCC) OF SCALP: ICD-10-CM

## 2023-11-17 DIAGNOSIS — I10 PRIMARY HYPERTENSION: ICD-10-CM

## 2023-11-17 DIAGNOSIS — Z00.00 WELL ADULT EXAM: Primary | ICD-10-CM

## 2023-11-17 PROCEDURE — 99396 PREV VISIT EST AGE 40-64: CPT | Performed by: FAMILY MEDICINE

## 2023-11-17 PROCEDURE — 90471 IMMUNIZATION ADMIN: CPT

## 2023-11-17 PROCEDURE — 90686 IIV4 VACC NO PRSV 0.5 ML IM: CPT

## 2023-11-17 RX ORDER — AMLODIPINE BESYLATE 10 MG/1
10 TABLET ORAL DAILY
Qty: 90 TABLET | Refills: 3 | Status: SHIPPED | OUTPATIENT
Start: 2023-11-17

## 2023-11-17 RX ORDER — HYDROCHLOROTHIAZIDE 25 MG/1
25 TABLET ORAL DAILY
Qty: 90 TABLET | Refills: 3 | Status: SHIPPED | OUTPATIENT
Start: 2023-11-17

## 2023-11-17 NOTE — PATIENT INSTRUCTIONS
Blood work after 4/11/2024  Ok for trazodone to 150mg   General surgery evaulation for umbilical hernia   Call with date of appointment - NPI number     Wellness Visit for Adults   AMBULATORY CARE:   A wellness visit  is when you see your healthcare provider to get screened for health problems. Your healthcare provider will also give you advice on how to stay healthy. Write down your questions so you remember to ask them. Ask your healthcare provider how often you should have a wellness visit. What happens at a wellness visit:  Your healthcare provider will ask about your health, and your family history of health problems. This includes high blood pressure, heart disease, and cancer. He or she will ask if you have symptoms that concern you, if you smoke, and about your mood. You may also be asked about your intake of medicines, supplements, food, and alcohol. Any of the following may be done: Your weight  will be checked. Your height may also be checked so your body mass index (BMI) can be calculated. Your BMI shows if you are at a healthy weight. Your blood pressure  and heart rate will be checked. Your temperature may also be checked. Blood and urine tests  may be done. Blood tests may be done to check your cholesterol levels. Abnormal cholesterol levels increase your risk for heart disease and stroke. You may also need a blood or urine test to check for diabetes if you are at increased risk. Urine tests may be done to look for signs of an infection or kidney disease. A physical exam  includes checking your heartbeat and lungs with a stethoscope. Your healthcare provider may also check your skin to look for sun damage. Screening tests  may be recommended. A screening test is done to check for diseases that may not cause symptoms. The screening tests you may need depend on your age, gender, family history, and lifestyle habits.  For example, colorectal screening may be recommended if you are 50 years old or older. Screening tests you need if you are a woman:   A Pap smear  is used to screen for cervical cancer. Pap smears are usually done every 3 to 5 years depending on your age. You may need them more often if you have had abnormal Pap smear test results in the past. Ask your healthcare provider how often you should have a Pap smear. A mammogram  is an x-ray of your breasts to screen for breast cancer. Experts recommend mammograms every 2 years starting at age 48 years. You may need a mammogram at age 52 years or younger if you have an increased risk for breast cancer. Talk to your healthcare provider about when you should start having mammograms and how often you need them. Vaccines you may need:   Get an influenza vaccine  every year. The influenza vaccine protects you from the flu. Several types of viruses cause the flu. The viruses change over time, so new vaccines are made each year. Get a tetanus-diphtheria (Td) booster vaccine  every 10 years. This vaccine protects you against tetanus and diphtheria. Tetanus is a severe infection that may cause painful muscle spasms and lockjaw. Diphtheria is a severe bacterial infection that causes a thick covering in the back of your mouth and throat. Get a human papillomavirus (HPV) vaccine  if you are female and aged 23 to 32 or male 23 to 24 and never received it. This vaccine protects you from HPV infection. HPV is the most common infection spread by sexual contact. HPV may also cause vaginal, penile, and anal cancers. Get a pneumococcal vaccine  if you are aged 72 years or older. The pneumococcal vaccine is an injection given to protect you from pneumococcal disease. Pneumococcal disease is an infection caused by pneumococcal bacteria. The infection may cause pneumonia, meningitis, or an ear infection. Get a shingles vaccine  if you are 60 or older, even if you have had shingles before.  The shingles vaccine is an injection to protect you from the varicella-zoster virus. This is the same virus that causes chickenpox. Shingles is a painful rash that develops in people who had chickenpox or have been exposed to the virus. How to eat healthy:  My Plate is a model for planning healthy meals. It shows the types and amounts of foods that should go on your plate. Fruits and vegetables make up about half of your plate, and grains and protein make up the other half. A serving of dairy is included on the side of your plate. The amount of calories and serving sizes you need depends on your age, gender, weight, and height. Examples of healthy foods are listed below:  Eat a variety of vegetables  such as dark green, red, and orange vegetables. You can also include canned vegetables low in sodium (salt) and frozen vegetables without added butter or sauces. Eat a variety of fresh fruits , canned fruit in 100% juice, frozen fruit, and dried fruit. Include whole grains. At least half of the grains you eat should be whole grains. Examples include whole-wheat bread, wheat pasta, brown rice, and whole-grain cereals such as oatmeal.    Eat a variety of protein foods such as seafood (fish and shellfish), lean meat, and poultry without skin (turkey and chicken). Examples of lean meats include pork leg, shoulder, or tenderloin, and beef round, sirloin, tenderloin, and extra lean ground beef. Other protein foods include eggs and egg substitutes, beans, peas, soy products, nuts, and seeds. Choose low-fat dairy products such as skim or 1% milk or low-fat yogurt, cheese, and cottage cheese. Limit unhealthy fats  such as butter, hard margarine, and shortening. Exercise:  Exercise at least 30 minutes per day on most days of the week. Some examples of exercise include walking, biking, dancing, and swimming. You can also fit in more physical activity by taking the stairs instead of the elevator or parking farther away from stores.  Include muscle strengthening activities 2 days each week. Regular exercise provides many health benefits. It helps you manage your weight, and decreases your risk for type 2 diabetes, heart disease, stroke, and high blood pressure. Exercise can also help improve your mood. Ask your healthcare provider about the best exercise plan for you. General health and safety guidelines:   Do not smoke. Nicotine and other chemicals in cigarettes and cigars can cause lung damage. Ask your healthcare provider for information if you currently smoke and need help to quit. E-cigarettes or smokeless tobacco still contain nicotine. Talk to your healthcare provider before you use these products. Limit alcohol. A drink of alcohol is 12 ounces of beer, 5 ounces of wine, or 1½ ounces of liquor. Lose weight, if needed. Being overweight increases your risk of certain health conditions. These include heart disease, high blood pressure, type 2 diabetes, and certain types of cancer. Protect your skin. Do not sunbathe or use tanning beds. Use sunscreen with a SPF 15 or higher. Apply sunscreen at least 15 minutes before you go outside. Reapply sunscreen every 2 hours. Wear protective clothing, hats, and sunglasses when you are outside. Drive safely. Always wear your seatbelt. Make sure everyone in your car wears a seatbelt. A seatbelt can save your life if you are in an accident. Do not use your cell phone when you are driving. This could distract you and cause an accident. Pull over if you need to make a call or send a text message. Practice safe sex. Use latex condoms if are sexually active and have more than one partner. Your healthcare provider may recommend screening tests for sexually transmitted infections (STIs). Wear helmets, lifejackets, and protective gear. Always wear a helmet when you ride a bike or motorcycle, go skiing, or play sports that could cause a head injury. Wear protective equipment when you play sports.  Wear a jessicacket when you are on a boat or doing water sports. © Copyright [employer]ala [employer]at 2023 Information is for End User's use only and may not be sold, redistributed or otherwise used for commercial purposes. The above information is an  only. It is not intended as medical advice for individual conditions or treatments. Talk to your doctor, nurse or pharmacist before following any medical regimen to see if it is safe and effective for you.

## 2023-11-18 PROBLEM — Z23 ENCOUNTER FOR IMMUNIZATION: Status: ACTIVE | Noted: 2023-11-18

## 2023-11-18 PROBLEM — K42.9 UMBILICAL HERNIA WITHOUT OBSTRUCTION AND WITHOUT GANGRENE: Status: ACTIVE | Noted: 2023-11-18

## 2023-11-18 RX ORDER — TRAZODONE HYDROCHLORIDE 100 MG/1
150 TABLET ORAL
Qty: 135 TABLET | Refills: 0
Start: 2023-11-18 | End: 2023-11-20 | Stop reason: SDUPTHER

## 2023-11-19 NOTE — PROGRESS NOTES
[id number]    NAME: Jeff Layton  AGE: 59 y.o. SEX: male  : 1960     DATE: 2023     Assessment and Plan:     Problem List Items Addressed This Visit          Cardiovascular and Mediastinum    Hypertension     Controlled on current medication         Relevant Medications    amLODIPine (NORVASC) 10 mg tablet    hydrochlorothiazide (HYDRODIURIL) 25 mg tablet    Other Relevant Orders    CBC and differential    Comprehensive metabolic panel    Lipid Panel with Direct LDL reflex    TSH, 3rd generation with Free T4 reflex    PSA, total and free    CBC and differential    Comprehensive metabolic panel    Lipid Panel with Direct LDL reflex    TSH, 3rd generation with Free T4 reflex    PSA, total and free       Other    Chronic insomnia     Helps with sleeping but wakes up in early am. Ok to increase trazodone to 150mg daily         Relevant Medications    traZODone (DESYREL) 100 mg tablet    Other Relevant Orders    CBC and differential    Comprehensive metabolic panel    Lipid Panel with Direct LDL reflex    TSH, 3rd generation with Free T4 reflex    PSA, total and free    Well adult exam - Primary    Encounter for immunization    Relevant Orders    influenza vaccine, quadrivalent, 0.5 mL, preservative-free, for adult and pediatric patients 6 mos+ (AFLURIA, FLUARIX, FLULAVAL, FLUZONE) (Completed)    BMI 19.2-51.1, adult    Umbilical hernia without obstruction and without gangrene    Relevant Orders    Ambulatory Referral to General Surgery     Other Visit Diagnoses       Umbilical hernia with obstruction, without gangrene        Basal cell carcinoma (BCC) of scalp        Relevant Orders    Ambulatory Referral to Dermatology            Immunizations and preventive care screenings were discussed with patient today. Appropriate education was printed on patient's after visit summary.     Discussed risks and benefits of prostate cancer screening. We discussed the controversial history of PSA screening for prostate cancer in the [facility] as well as the risk of over detection and over treatment of prostate cancer by way of PSA screening. The patient understands that PSA blood testing is an imperfect way to screen for prostate cancer and that elevated PSA levels in the blood may also be caused by infection, inflammation, prostatic trauma or manipulation, urological procedures, or by benign prostatic enlargement. The role of the digital rectal examination in prostate cancer screening was also discussed and I discussed with him that there is large interobserver variability in the findings of digital rectal examination. Counseling:  Dental Health: discussed importance of regular tooth brushing, flossing, and dental visits. Exercise: the importance of regular exercise/physical activity was discussed. Recommend exercise 3-5 times per week for at least 30 minutes. BMI Counseling: Body mass index is 25.7 kg/m². The BMI is above normal. Nutrition recommendations include decreasing portion sizes. Exercise recommendations include exercising 3-5 times per week. No pharmacotherapy was ordered. Rationale for BMI follow-up plan is due to patient being overweight or obese. Depression Screening and Follow-up Plan: Patient was screened for depression during today's encounter. They screened negative with a PHQ-2 score of 2. No follow-ups on file. Chief Complaint:     Chief Complaint   Patient presents with    Physical Exam     PHYSICAL  Phq 2  Patient due blood work 4/11/23 but wants script   158/90      History of Present Illness:     Adult Annual Physical   Patient here for a comprehensive physical exam. The patient reports no problems. Diet and Physical Activity  Diet/Nutrition: well balanced diet. Exercise: walking.       Depression Screening  PHQ-2/9 Depression Screening    Little interest or pleasure in doing things: 1 - several days  Feeling down, depressed, or hopeless: 1 - several days  PHQ-2 Score: 2  PHQ-2 Interpretation: Negative depression screen       General Health  Sleep: sleeps well and with medication . Hearing: normal - bilateral.  Vision: no vision problems. Dental: regular dental visits.  Health  Symptoms include: nocturia    Advanced Care Planning  Do you have an advanced directive? no  Do you have a durable medical power of ? no     Review of Systems:     Review of Systems   Past Medical History:     Past Medical History:   Diagnosis Date    Hypertension     Kidney stone     Skin cancer     Gibran-Parkinson-White (WPW) syndrome       Past Surgical History:     Past Surgical History:   Procedure Laterality Date    BASAL CELL CARCINOMA EXCISION      CARDIAC ELECTROPHYSIOLOGY MAPPING AND ABLATION      CYSTOSCOPY W/ LASER LITHOTRIPSY      HERNIA REPAIR      SQUAMOUS CELL CARCINOMA EXCISION      WISDOM TOOTH EXTRACTION        Family History:     Family History   Problem Relation Age of Onset    Colon cancer Mother     Uterine cancer Mother     Skin cancer Mother       Social History:     Social History     Socioeconomic History    Marital status: Single     Spouse name: None    Number of children: None    Years of education: None    Highest education level: None   Occupational History    None   Tobacco Use    Smoking status: Former     Types: Cigarettes     Quit date:      Years since quittin.8    Smokeless tobacco: Never   Vaping Use    Vaping Use: Never used   Substance and Sexual Activity    Alcohol use:  Yes     Alcohol/week: 15.0 standard drinks of alcohol     Types: 15 Cans of beer per week    Drug use: Never    Sexual activity: None   Other Topics Concern    None   Social History Narrative    None     Social Determinants of Health     Financial Resource Strain: Not on file   Food Insecurity: Not on file   Transportation Needs: Not on file   Physical Activity: Not on file   Stress: Not on file   Social Connections: Not on file   Intimate Partner Violence: Not on file   Housing Stability: Not on file      Current Medications:     Current Outpatient Medications   Medication Sig Dispense Refill    amLODIPine (NORVASC) 10 mg tablet Take 1 tablet (10 mg total) by mouth daily 90 tablet 3    hydrochlorothiazide (HYDRODIURIL) 25 mg tablet Take 1 tablet (25 mg total) by mouth daily 90 tablet 3    traZODone (DESYREL) 100 mg tablet Take 1.5 tablets (150 mg total) by mouth daily at bedtime 135 tablet 0     No current facility-administered medications for this visit. Allergies:     No Known Allergies   Physical Exam:     /80   Pulse 78   Temp 97.7 °F (36.5 °C)   Ht 5' 9" (1.753 m)   Wt 78.9 kg (174 lb)   SpO2 97%   BMI 25.70 kg/m²     Physical Exam  Vitals and nursing note reviewed. Constitutional:       Appearance: He is well-developed. HENT:      Head: Normocephalic. Right Ear: External ear normal.      Left Ear: External ear normal.      Nose: Nose normal.   Eyes:      Conjunctiva/sclera: Conjunctivae normal.      Pupils: Pupils are equal, round, and reactive to light. Cardiovascular:      Rate and Rhythm: Normal rate and regular rhythm. Pulmonary:      Effort: Pulmonary effort is normal.      Breath sounds: Normal breath sounds. Abdominal:      General: Bowel sounds are normal.      Palpations: Abdomen is soft. Hernia: A hernia is present. Comments: Non incarcerated umbilical hernia   Musculoskeletal:         General: Normal range of motion. Cervical back: Normal range of motion and neck supple. Skin:     General: Skin is warm and dry. Neurological:      Mental Status: He is alert and oriented to person, place, and time. Psychiatric:         Behavior: Behavior normal.         Thought Content:  Thought content normal.         Judgment: Judgment normal.          Francisco Lehman, DO  [address]

## 2023-11-19 NOTE — ASSESSMENT & PLAN NOTE
Helps with sleeping but wakes up in early am. Migdalia Mccartney to increase trazodone to 150mg daily

## 2023-11-20 ENCOUNTER — TELEPHONE (OUTPATIENT)
Dept: FAMILY MEDICINE CLINIC | Facility: CLINIC | Age: 63
End: 2023-11-20

## 2023-11-20 DIAGNOSIS — F51.04 CHRONIC INSOMNIA: ICD-10-CM

## 2023-11-20 RX ORDER — TRAZODONE HYDROCHLORIDE 150 MG/1
150 TABLET ORAL
Qty: 90 TABLET | Refills: 1 | Status: SHIPPED | OUTPATIENT
Start: 2023-11-20

## 2023-11-20 NOTE — TELEPHONE ENCOUNTER
My name is Symone Strange. Birthday is [birth year]. I'm calling about my Trazodone prescription. Doctor Estella Bass wanted me to call her so she could increase the dosage before I get my next prescription filled. I am out. I need a refill, so if you could have her expedite that, I would appreciate it. Thank you.

## 2023-12-05 DIAGNOSIS — I10 HYPERTENSION, UNSPECIFIED TYPE: ICD-10-CM

## 2023-12-05 DIAGNOSIS — I10 PRIMARY HYPERTENSION: ICD-10-CM

## 2023-12-05 RX ORDER — HYDROCHLOROTHIAZIDE 25 MG/1
25 TABLET ORAL DAILY
Qty: 90 TABLET | Refills: 0 | Status: CANCELLED | OUTPATIENT
Start: 2023-12-05

## 2023-12-05 RX ORDER — AMLODIPINE BESYLATE 10 MG/1
10 TABLET ORAL DAILY
Qty: 90 TABLET | Refills: 0 | Status: CANCELLED | OUTPATIENT
Start: 2023-12-05

## 2024-01-16 PROBLEM — Z00.00 WELL ADULT EXAM: Status: RESOLVED | Noted: 2022-11-28 | Resolved: 2024-01-16

## 2024-01-26 ENCOUNTER — APPOINTMENT (OUTPATIENT)
Dept: LAB | Facility: CLINIC | Age: 64
End: 2024-01-26
Payer: COMMERCIAL

## 2024-01-26 DIAGNOSIS — F51.04 PSYCHOPHYSIOLOGICAL INSOMNIA: ICD-10-CM

## 2024-01-26 DIAGNOSIS — I10 ESSENTIAL HYPERTENSION, MALIGNANT: ICD-10-CM

## 2024-01-26 LAB
ALBUMIN SERPL BCP-MCNC: 4 G/DL (ref 3.5–5)
ALP SERPL-CCNC: 57 U/L (ref 34–104)
ALT SERPL W P-5'-P-CCNC: 19 U/L (ref 7–52)
ANION GAP SERPL CALCULATED.3IONS-SCNC: 6 MMOL/L
AST SERPL W P-5'-P-CCNC: 20 U/L (ref 13–39)
BASOPHILS # BLD AUTO: 0.04 THOUSANDS/ÂΜL (ref 0–0.1)
BASOPHILS NFR BLD AUTO: 1 % (ref 0–1)
BILIRUB SERPL-MCNC: 0.85 MG/DL (ref 0.2–1)
BUN SERPL-MCNC: 15 MG/DL (ref 5–25)
CALCIUM SERPL-MCNC: 8.9 MG/DL (ref 8.4–10.2)
CHLORIDE SERPL-SCNC: 100 MMOL/L (ref 96–108)
CHOLEST SERPL-MCNC: 182 MG/DL
CO2 SERPL-SCNC: 30 MMOL/L (ref 21–32)
CREAT SERPL-MCNC: 1.21 MG/DL (ref 0.6–1.3)
EOSINOPHIL # BLD AUTO: 0.07 THOUSAND/ÂΜL (ref 0–0.61)
EOSINOPHIL NFR BLD AUTO: 1 % (ref 0–6)
ERYTHROCYTE [DISTWIDTH] IN BLOOD BY AUTOMATED COUNT: 11.9 % (ref 11.6–15.1)
GFR SERPL CREATININE-BSD FRML MDRD: 63 ML/MIN/1.73SQ M
GLUCOSE P FAST SERPL-MCNC: 89 MG/DL (ref 65–99)
HCT VFR BLD AUTO: 42.2 % (ref 36.5–49.3)
HDLC SERPL-MCNC: 106 MG/DL
HGB BLD-MCNC: 14.5 G/DL (ref 12–17)
IMM GRANULOCYTES # BLD AUTO: 0.02 THOUSAND/UL (ref 0–0.2)
IMM GRANULOCYTES NFR BLD AUTO: 0 % (ref 0–2)
LDLC SERPL CALC-MCNC: 68 MG/DL (ref 0–100)
LYMPHOCYTES # BLD AUTO: 1.41 THOUSANDS/ÂΜL (ref 0.6–4.47)
LYMPHOCYTES NFR BLD AUTO: 26 % (ref 14–44)
MCH RBC QN AUTO: 31.5 PG (ref 26.8–34.3)
MCHC RBC AUTO-ENTMCNC: 34.4 G/DL (ref 31.4–37.4)
MCV RBC AUTO: 92 FL (ref 82–98)
MONOCYTES # BLD AUTO: 0.47 THOUSAND/ÂΜL (ref 0.17–1.22)
MONOCYTES NFR BLD AUTO: 9 % (ref 4–12)
NEUTROPHILS # BLD AUTO: 3.51 THOUSANDS/ÂΜL (ref 1.85–7.62)
NEUTS SEG NFR BLD AUTO: 63 % (ref 43–75)
NRBC BLD AUTO-RTO: 0 /100 WBCS
PLATELET # BLD AUTO: 185 THOUSANDS/UL (ref 149–390)
PMV BLD AUTO: 13 FL (ref 8.9–12.7)
POTASSIUM SERPL-SCNC: 4 MMOL/L (ref 3.5–5.3)
PROT SERPL-MCNC: 6.8 G/DL (ref 6.4–8.4)
RBC # BLD AUTO: 4.6 MILLION/UL (ref 3.88–5.62)
SODIUM SERPL-SCNC: 136 MMOL/L (ref 135–147)
TRIGL SERPL-MCNC: 42 MG/DL
TSH SERPL DL<=0.05 MIU/L-ACNC: 0.94 UIU/ML (ref 0.45–4.5)
WBC # BLD AUTO: 5.52 THOUSAND/UL (ref 4.31–10.16)

## 2024-01-26 PROCEDURE — 85025 COMPLETE CBC W/AUTO DIFF WBC: CPT

## 2024-01-26 PROCEDURE — 36415 COLL VENOUS BLD VENIPUNCTURE: CPT

## 2024-01-26 PROCEDURE — 84443 ASSAY THYROID STIM HORMONE: CPT

## 2024-01-26 PROCEDURE — 80053 COMPREHEN METABOLIC PANEL: CPT

## 2024-01-26 PROCEDURE — 84153 ASSAY OF PSA TOTAL: CPT

## 2024-01-26 PROCEDURE — 84154 ASSAY OF PSA FREE: CPT

## 2024-01-26 PROCEDURE — 80061 LIPID PANEL: CPT

## 2024-01-27 LAB
PSA FREE MFR SERPL: 45 %
PSA FREE SERPL-MCNC: 0.54 NG/ML
PSA SERPL-MCNC: 1.2 NG/ML (ref 0–4)

## 2024-01-29 ENCOUNTER — TELEPHONE (OUTPATIENT)
Dept: FAMILY MEDICINE CLINIC | Facility: CLINIC | Age: 64
End: 2024-01-29

## 2024-01-29 NOTE — TELEPHONE ENCOUNTER
----- Message from Alyssia Rene DO sent at 1/29/2024 12:46 AM EST -----  Your blood count is normal  Your prostate level is normal  Your thyroid level is normal.  Your liver and kidney function is normal.   Your cholesterol panel is good.

## 2024-01-31 ENCOUNTER — TELEPHONE (OUTPATIENT)
Dept: FAMILY MEDICINE CLINIC | Facility: CLINIC | Age: 64
End: 2024-01-31

## 2024-01-31 NOTE — TELEPHONE ENCOUNTER
Under the heading CBC w/Differential, subheading MPV.  Normal range 8.9-12.7.  My results show 13.0/high.  What is MPV?

## 2024-01-31 NOTE — TELEPHONE ENCOUNTER
Patient calling because he had some questions regarding his blood work results. Patient would like to know what MVP means in his CBC blood work as it was a little high and wants to know if this is something he should be worried about. Patient also wanted to know where is sodium level is as last year he was low. Patient would like to know what hs blood sugar was as last year was high.    Please advise

## 2024-02-01 DIAGNOSIS — D69.1 ABNORMAL PLATELETS (HCC): Primary | ICD-10-CM

## 2024-04-14 DIAGNOSIS — F51.04 CHRONIC INSOMNIA: ICD-10-CM

## 2024-04-15 RX ORDER — TRAZODONE HYDROCHLORIDE 150 MG/1
TABLET ORAL
Qty: 30 TABLET | Refills: 2 | Status: SHIPPED | OUTPATIENT
Start: 2024-04-15

## 2024-07-23 DIAGNOSIS — F51.04 CHRONIC INSOMNIA: ICD-10-CM

## 2024-07-23 RX ORDER — TRAZODONE HYDROCHLORIDE 150 MG/1
TABLET ORAL
Qty: 30 TABLET | Refills: 2 | Status: SHIPPED | OUTPATIENT
Start: 2024-07-23

## 2024-10-18 DIAGNOSIS — F51.04 CHRONIC INSOMNIA: ICD-10-CM

## 2024-10-18 RX ORDER — TRAZODONE HYDROCHLORIDE 150 MG/1
TABLET ORAL
Qty: 30 TABLET | Refills: 5 | Status: SHIPPED | OUTPATIENT
Start: 2024-10-18

## 2024-11-17 DIAGNOSIS — I10 PRIMARY HYPERTENSION: ICD-10-CM

## 2024-11-19 RX ORDER — HYDROCHLOROTHIAZIDE 25 MG/1
25 TABLET ORAL DAILY
Qty: 30 TABLET | Refills: 0 | Status: SHIPPED | OUTPATIENT
Start: 2024-11-19

## 2024-12-07 DIAGNOSIS — I10 HYPERTENSION, UNSPECIFIED TYPE: ICD-10-CM

## 2024-12-09 RX ORDER — AMLODIPINE BESYLATE 10 MG/1
10 TABLET ORAL DAILY
Qty: 30 TABLET | Refills: 0 | Status: SHIPPED | OUTPATIENT
Start: 2024-12-09 | End: 2024-12-20 | Stop reason: SDUPTHER

## 2024-12-20 ENCOUNTER — OFFICE VISIT (OUTPATIENT)
Dept: FAMILY MEDICINE CLINIC | Facility: CLINIC | Age: 64
End: 2024-12-20
Payer: COMMERCIAL

## 2024-12-20 VITALS
HEIGHT: 69 IN | TEMPERATURE: 96.7 F | DIASTOLIC BLOOD PRESSURE: 80 MMHG | SYSTOLIC BLOOD PRESSURE: 130 MMHG | OXYGEN SATURATION: 98 % | HEART RATE: 65 BPM | BODY MASS INDEX: 24.88 KG/M2 | WEIGHT: 168 LBS

## 2024-12-20 DIAGNOSIS — Z13.29 SCREENING FOR ENDOCRINE, METABOLIC AND IMMUNITY DISORDER: ICD-10-CM

## 2024-12-20 DIAGNOSIS — Z13.1 SCREENING FOR DIABETES MELLITUS (DM): ICD-10-CM

## 2024-12-20 DIAGNOSIS — Z12.5 SCREENING FOR PROSTATE CANCER: ICD-10-CM

## 2024-12-20 DIAGNOSIS — I10 HYPERTENSION, UNSPECIFIED TYPE: ICD-10-CM

## 2024-12-20 DIAGNOSIS — Z23 ENCOUNTER FOR IMMUNIZATION: ICD-10-CM

## 2024-12-20 DIAGNOSIS — Z13.220 SCREENING FOR LIPID DISORDERS: ICD-10-CM

## 2024-12-20 DIAGNOSIS — Z00.00 WELL ADULT EXAM: Primary | ICD-10-CM

## 2024-12-20 DIAGNOSIS — F51.04 CHRONIC INSOMNIA: ICD-10-CM

## 2024-12-20 DIAGNOSIS — Z13.0 SCREENING FOR ENDOCRINE, METABOLIC AND IMMUNITY DISORDER: ICD-10-CM

## 2024-12-20 DIAGNOSIS — Z13.228 SCREENING FOR ENDOCRINE, METABOLIC AND IMMUNITY DISORDER: ICD-10-CM

## 2024-12-20 DIAGNOSIS — Z13.0 SCREENING FOR DEFICIENCY ANEMIA: ICD-10-CM

## 2024-12-20 DIAGNOSIS — I10 PRIMARY HYPERTENSION: ICD-10-CM

## 2024-12-20 PROCEDURE — 90673 RIV3 VACCINE NO PRESERV IM: CPT | Performed by: FAMILY MEDICINE

## 2024-12-20 PROCEDURE — 99396 PREV VISIT EST AGE 40-64: CPT | Performed by: FAMILY MEDICINE

## 2024-12-20 PROCEDURE — 90471 IMMUNIZATION ADMIN: CPT | Performed by: FAMILY MEDICINE

## 2024-12-20 RX ORDER — AMLODIPINE BESYLATE 10 MG/1
10 TABLET ORAL DAILY
Qty: 90 TABLET | Refills: 3 | Status: SHIPPED | OUTPATIENT
Start: 2024-12-20

## 2024-12-20 RX ORDER — HYDROCHLOROTHIAZIDE 12.5 MG/1
12.5 TABLET ORAL DAILY
Qty: 90 TABLET | Refills: 0 | Status: SHIPPED | OUTPATIENT
Start: 2024-12-20

## 2024-12-29 NOTE — PROGRESS NOTES
Adult Annual Physical  Name: Pedro Gaytan      : 1960      MRN: [id number]  Encounter Provider: Alyssia Rene DO  Encounter Date: 2024   Encounter department: [facility]    Assessment & Plan  Well adult exam         Primary hypertension  Controlled on current medication, amlodipine and hctz    Orders:    hydroCHLOROthiazide 12.5 mg tablet; Take 1 tablet (12.5 mg total) by mouth daily    Chronic insomnia  Improved on trazodone         Hypertension, unspecified type  Controlled on current medication, amlodipine and hctz    Orders:    amLODIPine (NORVASC) 10 mg tablet; Take 1 tablet (10 mg total) by mouth daily    CBC and differential; Future    Comprehensive metabolic panel; Future    Lipid Panel with Direct LDL reflex    TSH, 3rd generation with Free T4 reflex; Future    Encounter for immunization    Orders:    influenza vaccine, recombinant, PF, 0.5 mL IM (Flublok)    Screening for diabetes mellitus (DM)    Orders:    Comprehensive metabolic panel; Future    Screening for deficiency anemia    Orders:    CBC and differential; Future    Screening for lipid disorders    Orders:    Lipid Panel with Direct LDL reflex    Screening for endocrine, metabolic and immunity disorder    Orders:    Comprehensive metabolic panel; Future    TSH, 3rd generation with Free T4 reflex; Future    Screening for prostate cancer    Orders:    PSA, total and free; Future    Immunizations and preventive care screenings were discussed with patient today. Appropriate education was printed on patient's after visit summary.    Discussed risks and benefits of prostate cancer screening. We discussed the controversial history of PSA screening for prostate cancer in the [place] as well as the risk of over detection and over treatment of prostate cancer by way of PSA screening.  The patient understands that PSA blood testing is an imperfect way to screen for prostate cancer and that elevated PSA levels in the  blood may also be caused by infection, inflammation, prostatic trauma or manipulation, urological procedures, or by benign prostatic enlargement.    The role of the digital rectal examination in prostate cancer screening was also discussed and I discussed with him that there is large interobserver variability in the findings of digital rectal examination.    Counseling:  Dental Health: discussed importance of regular tooth brushing, flossing, and dental visits.  Exercise: the importance of regular exercise/physical activity was discussed. Recommend exercise 3-5 times per week for at least 30 minutes.       Depression Screening and Follow-up Plan: Patient was screened for depression during today's encounter. They screened negative with a PHQ-2 score of 2.        History of Present Illness     Adult Annual Physical:  Patient presents for annual physical. Pt is here for a physical today. .     Depression Screening:  - PHQ-2 Score: 2    Review of Systems   Constitutional: Negative.  Negative for fatigue and fever.   HENT: Negative.     Eyes: Negative.    Respiratory: Negative.  Negative for cough.    Cardiovascular: Negative.    Gastrointestinal: Negative.    Endocrine: Negative.    Genitourinary: Negative.    Musculoskeletal: Negative.    Skin: Negative.    Allergic/Immunologic: Negative.    Neurological: Negative.    Psychiatric/Behavioral: Negative.             Current Outpatient Medications on File Prior to Visit   Medication Sig Dispense Refill    traZODone (DESYREL) 150 mg tablet TAKE 1 TABLET BY MOUTH DAILY AT BEDTIME- TAKE WITH FOOD 30 tablet 5     No current facility-administered medications on file prior to visit.      Social History     Tobacco Use    Smoking status: Former     Current packs/day: 0.00     Types: Cigarettes     Quit date:      Years since quittin.9    Smokeless tobacco: Never   Vaping Use    Vaping status: Never Used   Substance and Sexual Activity    Alcohol use: Yes     Alcohol/week:  "15.0 standard drinks of alcohol     Types: 15 Cans of beer per week    Drug use: Never    Sexual activity: Not on file       Objective   /80   Pulse 65   Temp (!) 96.7 °F (35.9 °C) (Tympanic)   Ht 5' 9\" (1.753 m)   Wt 76.2 kg (168 lb)   SpO2 98%   BMI 24.81 kg/m²     Physical Exam  Vitals and nursing note reviewed.   Constitutional:       Appearance: He is well-developed.   HENT:      Head: Normocephalic.      Right Ear: External ear normal.      Left Ear: External ear normal.      Nose: Nose normal.   Eyes:      Conjunctiva/sclera: Conjunctivae normal.      Pupils: Pupils are equal, round, and reactive to light.   Cardiovascular:      Rate and Rhythm: Normal rate and regular rhythm.   Pulmonary:      Effort: Pulmonary effort is normal.      Breath sounds: Normal breath sounds.   Abdominal:      General: Bowel sounds are normal.      Palpations: Abdomen is soft.   Musculoskeletal:         General: Normal range of motion.      Cervical back: Normal range of motion and neck supple.   Skin:     General: Skin is warm and dry.   Neurological:      Mental Status: He is alert and oriented to person, place, and time.   Psychiatric:         Behavior: Behavior normal.         Thought Content: Thought content normal.         Judgment: Judgment normal.         "

## 2024-12-29 NOTE — ASSESSMENT & PLAN NOTE
Controlled on current medication, amlodipine and hctz    Orders:    amLODIPine (NORVASC) 10 mg tablet; Take 1 tablet (10 mg total) by mouth daily    CBC and differential; Future    Comprehensive metabolic panel; Future    Lipid Panel with Direct LDL reflex    TSH, 3rd generation with Free T4 reflex; Future

## 2024-12-29 NOTE — ASSESSMENT & PLAN NOTE
Controlled on current medication, amlodipine and hctz    Orders:    hydroCHLOROthiazide 12.5 mg tablet; Take 1 tablet (12.5 mg total) by mouth daily

## 2025-01-27 PROBLEM — Z00.00 WELL ADULT EXAM: Status: RESOLVED | Noted: 2022-11-28 | Resolved: 2025-01-27

## 2025-02-06 LAB
ALBUMIN SERPL-MCNC: 4.6 G/DL (ref 3.9–4.9)
ALP SERPL-CCNC: 74 IU/L (ref 44–121)
ALT SERPL-CCNC: 19 IU/L (ref 0–44)
AST SERPL-CCNC: 22 IU/L (ref 0–40)
BASOPHILS # BLD AUTO: 0.1 X10E3/UL (ref 0–0.2)
BASOPHILS NFR BLD AUTO: 2 %
BILIRUB SERPL-MCNC: 0.6 MG/DL (ref 0–1.2)
BUN SERPL-MCNC: 19 MG/DL (ref 8–27)
BUN/CREAT SERPL: 17 (ref 10–24)
CALCIUM SERPL-MCNC: 9.2 MG/DL (ref 8.6–10.2)
CHLORIDE SERPL-SCNC: 97 MMOL/L (ref 96–106)
CHOLEST SERPL-MCNC: 197 MG/DL (ref 100–199)
CO2 SERPL-SCNC: 23 MMOL/L (ref 20–29)
CREAT SERPL-MCNC: 1.1 MG/DL (ref 0.76–1.27)
EGFR: 75 ML/MIN/1.73
EOSINOPHIL # BLD AUTO: 0.1 X10E3/UL (ref 0–0.4)
EOSINOPHIL NFR BLD AUTO: 3 %
ERYTHROCYTE [DISTWIDTH] IN BLOOD BY AUTOMATED COUNT: 11.5 % (ref 11.6–15.4)
GLOBULIN SER-MCNC: 2.8 G/DL (ref 1.5–4.5)
GLUCOSE SERPL-MCNC: 103 MG/DL (ref 70–99)
HCT VFR BLD AUTO: 46.1 % (ref 37.5–51)
HDLC SERPL-MCNC: 118 MG/DL
HGB BLD-MCNC: 15.4 G/DL (ref 13–17.7)
IMM GRANULOCYTES # BLD: 0 X10E3/UL (ref 0–0.1)
IMM GRANULOCYTES NFR BLD: 0 %
LDLC SERPL CALC-MCNC: 72 MG/DL (ref 0–99)
LDLC/HDLC SERPL: 0.6 RATIO (ref 0–3.6)
LYMPHOCYTES # BLD AUTO: 1.3 X10E3/UL (ref 0.7–3.1)
LYMPHOCYTES NFR BLD AUTO: 28 %
MCH RBC QN AUTO: 32.1 PG (ref 26.6–33)
MCHC RBC AUTO-ENTMCNC: 33.4 G/DL (ref 31.5–35.7)
MCV RBC AUTO: 96 FL (ref 79–97)
MONOCYTES # BLD AUTO: 0.4 X10E3/UL (ref 0.1–0.9)
MONOCYTES NFR BLD AUTO: 8 %
NEUTROPHILS # BLD AUTO: 2.8 X10E3/UL (ref 1.4–7)
NEUTROPHILS NFR BLD AUTO: 59 %
PLATELET # BLD AUTO: 202 X10E3/UL (ref 150–450)
POTASSIUM SERPL-SCNC: 4.5 MMOL/L (ref 3.5–5.2)
PROT SERPL-MCNC: 7.4 G/DL (ref 6–8.5)
PSA FREE MFR SERPL: 42.9 %
PSA FREE SERPL-MCNC: 0.73 NG/ML
PSA SERPL-MCNC: 1.7 NG/ML (ref 0–4)
RBC # BLD AUTO: 4.8 X10E6/UL (ref 4.14–5.8)
SL AMB VLDL CHOLESTEROL CALC: 7 MG/DL (ref 5–40)
SODIUM SERPL-SCNC: 136 MMOL/L (ref 134–144)
TRIGL SERPL-MCNC: 33 MG/DL (ref 0–149)
TSH SERPL DL<=0.005 MIU/L-ACNC: 1.25 UIU/ML (ref 0.45–4.5)
WBC # BLD AUTO: 4.6 X10E3/UL (ref 3.4–10.8)

## 2025-02-26 ENCOUNTER — TELEPHONE (OUTPATIENT)
Age: 65
End: 2025-02-26

## 2025-02-26 DIAGNOSIS — Z12.11 SCREEN FOR COLON CANCER: Primary | ICD-10-CM

## 2025-02-26 NOTE — TELEPHONE ENCOUNTER
Pt is requesting a referral for a colonoscopy. He states he lives in [facility] so will need to go close to home .

## 2025-04-12 ENCOUNTER — RESULTS FOLLOW-UP (OUTPATIENT)
Dept: FAMILY MEDICINE CLINIC | Facility: CLINIC | Age: 65
End: 2025-04-12

## 2025-04-19 DIAGNOSIS — I10 PRIMARY HYPERTENSION: ICD-10-CM

## 2025-04-20 RX ORDER — HYDROCHLOROTHIAZIDE 12.5 MG/1
12.5 TABLET ORAL DAILY
Qty: 90 TABLET | Refills: 0 | Status: SHIPPED | OUTPATIENT
Start: 2025-04-20

## 2025-08-02 DIAGNOSIS — I10 PRIMARY HYPERTENSION: ICD-10-CM

## 2025-08-04 RX ORDER — HYDROCHLOROTHIAZIDE 12.5 MG/1
12.5 TABLET ORAL DAILY
Qty: 90 TABLET | Refills: 0 | Status: SHIPPED | OUTPATIENT
Start: 2025-08-04